# Patient Record
Sex: FEMALE | Race: WHITE | NOT HISPANIC OR LATINO | URBAN - METROPOLITAN AREA
[De-identification: names, ages, dates, MRNs, and addresses within clinical notes are randomized per-mention and may not be internally consistent; named-entity substitution may affect disease eponyms.]

---

## 2017-10-12 ENCOUNTER — INPATIENT (INPATIENT)
Facility: HOSPITAL | Age: 72
LOS: 3 days | Discharge: NURSING, REHAB OR LONG-TERM CARE | End: 2017-10-16
Attending: INTERNAL MEDICINE

## 2017-10-12 DIAGNOSIS — S70.00XA CONTUSION OF UNSPECIFIED HIP, INITIAL ENCOUNTER: ICD-10-CM

## 2017-10-16 ENCOUNTER — INPATIENT (INPATIENT)
Facility: HOSPITAL | Age: 72
LOS: 15 days | Discharge: NURSING, REHAB OR LONG-TERM CARE | End: 2017-11-01
Attending: PHYSICAL MEDICINE & REHABILITATION

## 2017-10-16 DIAGNOSIS — S70.00XA CONTUSION OF UNSPECIFIED HIP, INITIAL ENCOUNTER: ICD-10-CM

## 2017-10-23 DIAGNOSIS — Z96.641 PRESENCE OF RIGHT ARTIFICIAL HIP JOINT: ICD-10-CM

## 2017-10-23 DIAGNOSIS — K59.00 CONSTIPATION, UNSPECIFIED: ICD-10-CM

## 2017-10-23 DIAGNOSIS — G35 MULTIPLE SCLEROSIS: ICD-10-CM

## 2017-10-23 DIAGNOSIS — R26.2 DIFFICULTY IN WALKING, NOT ELSEWHERE CLASSIFIED: ICD-10-CM

## 2017-10-23 DIAGNOSIS — D72.829 ELEVATED WHITE BLOOD CELL COUNT, UNSPECIFIED: ICD-10-CM

## 2017-10-23 DIAGNOSIS — K21.9 GASTRO-ESOPHAGEAL REFLUX DISEASE WITHOUT ESOPHAGITIS: ICD-10-CM
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
## 2017-10-23 DIAGNOSIS — S32.10XA UNSPECIFIED FRACTURE OF SACRUM, INITIAL ENCOUNTER FOR CLOSED FRACTURE: ICD-10-CM
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
## 2017-10-23 DIAGNOSIS — D68.51 ACTIVATED PROTEIN C RESISTANCE: ICD-10-CM

## 2017-10-24 DIAGNOSIS — S32.19XA OTHER FRACTURE OF SACRUM, INITIAL ENCOUNTER FOR CLOSED FRACTURE: ICD-10-CM

## 2017-10-24 DIAGNOSIS — S32.491A OTHER SPECIFIED FRACTURE OF RIGHT ACETABULUM, INITIAL ENCOUNTER FOR CLOSED FRACTURE: ICD-10-CM

## 2017-10-24 DIAGNOSIS — E78.5 HYPERLIPIDEMIA, UNSPECIFIED: ICD-10-CM
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
## 2017-11-03 DIAGNOSIS — R42 DIZZINESS AND GIDDINESS: ICD-10-CM

## 2017-11-03 DIAGNOSIS — D68.51 ACTIVATED PROTEIN C RESISTANCE: ICD-10-CM

## 2017-11-03 DIAGNOSIS — S32.10XD UNSPECIFIED FRACTURE OF SACRUM, SUBSEQUENT ENCOUNTER FOR FRACTURE WITH ROUTINE HEALING: ICD-10-CM
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
## 2017-11-03 DIAGNOSIS — M54.9 DORSALGIA, UNSPECIFIED: ICD-10-CM

## 2017-11-03 DIAGNOSIS — R00.0 TACHYCARDIA, UNSPECIFIED: ICD-10-CM

## 2017-11-03 DIAGNOSIS — Z90.49 ACQUIRED ABSENCE OF OTHER SPECIFIED PARTS OF DIGESTIVE TRACT: ICD-10-CM

## 2017-11-03 DIAGNOSIS — K20.9 ESOPHAGITIS, UNSPECIFIED: ICD-10-CM

## 2017-11-03 DIAGNOSIS — Z51.89 ENCOUNTER FOR OTHER SPECIFIED AFTERCARE: ICD-10-CM

## 2017-11-03 DIAGNOSIS — Z88.0 ALLERGY STATUS TO PENICILLIN: ICD-10-CM

## 2017-11-03 DIAGNOSIS — S32.9XXD FRACTURE OF UNSPECIFIED PARTS OF LUMBOSACRAL SPINE AND PELVIS, SUBSEQUENT ENCOUNTER FOR FRACTURE WITH ROUTINE HEALING: ICD-10-CM

## 2017-11-03 DIAGNOSIS — Z96.651 PRESENCE OF RIGHT ARTIFICIAL KNEE JOINT: ICD-10-CM

## 2017-11-03 DIAGNOSIS — E78.00 PURE HYPERCHOLESTEROLEMIA, UNSPECIFIED: ICD-10-CM

## 2017-11-03 DIAGNOSIS — G35 MULTIPLE SCLEROSIS: ICD-10-CM

## 2017-11-03 DIAGNOSIS — Z88.2 ALLERGY STATUS TO SULFONAMIDES: ICD-10-CM

## 2017-11-03 DIAGNOSIS — S32.431D: ICD-10-CM

## 2017-11-06 DIAGNOSIS — S32.411D DISPLACED FRACTURE OF ANTERIOR WALL OF RIGHT ACETABULUM, SUBSEQUENT ENCOUNTER FOR FRACTURE WITH ROUTINE HEALING: ICD-10-CM

## 2017-11-06 DIAGNOSIS — S32.10XD UNSPECIFIED FRACTURE OF SACRUM, SUBSEQUENT ENCOUNTER FOR FRACTURE WITH ROUTINE HEALING: ICD-10-CM

## 2019-05-29 ENCOUNTER — APPOINTMENT (OUTPATIENT)
Dept: CARDIOLOGY | Facility: CLINIC | Age: 74
End: 2019-05-29
Payer: MEDICARE

## 2019-05-29 PROCEDURE — 99214 OFFICE O/P EST MOD 30 MIN: CPT

## 2019-05-29 PROCEDURE — 93000 ELECTROCARDIOGRAM COMPLETE: CPT

## 2019-09-04 ENCOUNTER — APPOINTMENT (OUTPATIENT)
Dept: CARDIOLOGY | Facility: CLINIC | Age: 74
End: 2019-09-04
Payer: MEDICARE

## 2019-09-04 PROCEDURE — 99214 OFFICE O/P EST MOD 30 MIN: CPT | Mod: 25

## 2019-09-04 PROCEDURE — 93000 ELECTROCARDIOGRAM COMPLETE: CPT

## 2019-09-09 ENCOUNTER — APPOINTMENT (OUTPATIENT)
Dept: CARDIOLOGY | Facility: CLINIC | Age: 74
End: 2019-09-09
Payer: MEDICARE

## 2019-09-09 PROCEDURE — 93975 VASCULAR STUDY: CPT

## 2019-09-18 ENCOUNTER — APPOINTMENT (OUTPATIENT)
Dept: CARDIOLOGY | Facility: CLINIC | Age: 74
End: 2019-09-18
Payer: MEDICARE

## 2019-09-18 PROCEDURE — 99214 OFFICE O/P EST MOD 30 MIN: CPT

## 2019-09-18 PROCEDURE — 93000 ELECTROCARDIOGRAM COMPLETE: CPT

## 2019-10-21 ENCOUNTER — OUTPATIENT (OUTPATIENT)
Dept: OUTPATIENT SERVICES | Facility: HOSPITAL | Age: 74
LOS: 1 days | Discharge: HOME | End: 2019-10-21
Payer: MEDICARE

## 2019-10-21 DIAGNOSIS — Z12.31 ENCOUNTER FOR SCREENING MAMMOGRAM FOR MALIGNANT NEOPLASM OF BREAST: ICD-10-CM

## 2019-10-21 PROCEDURE — 77067 SCR MAMMO BI INCL CAD: CPT | Mod: 26

## 2019-10-21 PROCEDURE — 77063 BREAST TOMOSYNTHESIS BI: CPT | Mod: 26

## 2019-11-12 ENCOUNTER — OUTPATIENT (OUTPATIENT)
Dept: OUTPATIENT SERVICES | Facility: HOSPITAL | Age: 74
LOS: 1 days | Discharge: HOME | End: 2019-11-12
Payer: MEDICARE

## 2019-11-12 DIAGNOSIS — Z12.31 ENCOUNTER FOR SCREENING MAMMOGRAM FOR MALIGNANT NEOPLASM OF BREAST: ICD-10-CM

## 2019-11-12 PROCEDURE — 76641 ULTRASOUND BREAST COMPLETE: CPT | Mod: 26,50

## 2019-11-19 DIAGNOSIS — R92.2 INCONCLUSIVE MAMMOGRAM: ICD-10-CM

## 2019-11-22 ENCOUNTER — APPOINTMENT (OUTPATIENT)
Dept: BREAST CENTER | Facility: CLINIC | Age: 74
End: 2019-11-22
Payer: MEDICARE

## 2019-11-22 VITALS
BODY MASS INDEX: 26.43 KG/M2 | WEIGHT: 140 LBS | TEMPERATURE: 98.4 F | DIASTOLIC BLOOD PRESSURE: 78 MMHG | HEIGHT: 61 IN | SYSTOLIC BLOOD PRESSURE: 112 MMHG

## 2019-11-22 DIAGNOSIS — Z86.79 PERSONAL HISTORY OF OTHER DISEASES OF THE CIRCULATORY SYSTEM: ICD-10-CM

## 2019-11-22 PROCEDURE — 99203 OFFICE O/P NEW LOW 30 MIN: CPT

## 2019-11-24 PROBLEM — Z86.79 HISTORY OF HYPERTENSION: Status: RESOLVED | Noted: 2019-11-24 | Resolved: 2019-11-24

## 2019-11-24 NOTE — DATA REVIEWED
[FreeTextEntry1] : EXAM: US BREAST COMPLETE BI\par \par \par PROCEDURE DATE: 11/12/2019\par \par \par \par INTERPRETATION: Clinical History / Reason for exam: Dense breast screening\par \par The patient reports her last clinical breast examination was performed\par within the past year.\par \par Family history: Unknown\par \par Comparisons: Priors dating back to 2012.\par \par Findings:\par \par Ultrasound:\par \par Bilateral whole breast ultrasound was performed.\par \par No suspicious solid or cystic masses. No axillary adenopathy.\par \par Impression: No sonographic evidence of malignancy.\par \par Recommendation: Unless otherwise indicated by clinical findings, annual\par screening mammography recommended.\par \par BI-RADS Category 1: Negative\par \par \par The above findings and recommendations were discussed with the patient at\par the time of the examination.\par \par \par \par \par \par \par FAISAL SANTIAGO M.D., ATTENDING RADIOLOGIST\par This document has been electronically signed. Nov 12 2019 2:38PM\par EXAM: MG MAMMO SCREEN W LAUREN BI#\par \par \par PROCEDURE DATE: 10/21/2019\par \par \par \par INTERPRETATION: HISTORY:\par Bilateral MG MAMMO SCREEN W LAUREN BI# was performed. Patient is 74 years old\par and is seen for screening. The patient has a history of right needle\par biopsy in 2014 - benign and right needle biopsy more than 10 years ago -\par benign.\par \par RISK ASSESSMENT:\par NCI Lifetime Risk: 5.9\par Tyrer-Cuzick Lifetime Risk: 3.8\par \par CLINICAL BREAST EXAM:\par The patient reports her last clinical breast exam was performed 5 months ago.\par \par COMPARISON STUDIES:\par The present examination has been compared to prior imaging studies performed\par at An Outside Location on 10/18/2012, 10/29/2013, 01/13/2015, 04/05/2016,\par 09/22/2017 and 10/08/2018.\par \par MAMMOGRAM FINDINGS:\par Mammography was performed including the following views: bilateral\par craniocaudal with tomosynthesis, bilateral mediolateral oblique with\par tomosynthesis. The examination includes digital synthetic 2D and digital\par tomosynthesis 3D images. Additional imaging analysis was performed using CAD\par (computer-aided detection) software.\par \par The breasts are heterogeneously dense, which may obscure small masses.\par \par No suspicious mass, grouping of calcifications, or other abnormality is\par identified. There are no significant changes from the prior study.\par \par IMPRESSION:\par No mammographic evidence of malignancy.\par \par In light of dense parenchyma, consideration may be given to a screening\par ultrasound.\par \par RECOMMENDATION:\par Unless otherwise indicated by clinical findings, annual screening\par mammography recommended.\par \par ASSESSMENT:\par BI-RADS Category 1: Negative\par \par The patient will be notified of these results by telephone, and will also be\par mailed a written summary in layman's terms.\par \par \par \par \par \par \par \par \par SABINE COOPER M.D., ATTENDING RADIOLOGIST\par This document has been electronically signed. Oct 21 2019 2:04PM\par \par \par \par

## 2019-11-24 NOTE — PHYSICAL EXAM
[Normocephalic] : normocephalic [Atraumatic] : atraumatic [EOMI] : extra ocular movement intact [No Supraclavicular Adenopathy] : no supraclavicular adenopathy [No Cervical Adenopathy] : no cervical adenopathy [Symmetrical] : symmetrical [No dominant masses] : no dominant masses in right breast  [No dominant masses] : no dominant masses left breast [No Nipple Retraction] : no left nipple retraction [No Nipple Discharge] : no left nipple discharge [No Axillary Lymphadenopathy] : no left axillary lymphadenopathy [Soft] : abdomen soft [Not Tender] : non-tender [No Edema] : no edema [No Rashes] : no rashes [No Ulceration] : no ulceration [Examined in the supine and seated position] : examined in the supine and seated position [de-identified] : @12N5, 1 cm nodularity  [de-identified] : multiple b/l nondiscrete nodularities palpated throughout both breasts

## 2019-11-24 NOTE — HISTORY OF PRESENT ILLNESS
[FreeTextEntry1] : Teresita presents with fibrocystic changes.  She would like to establish her breast care in [place].  \par \par She has no breast related complaints at this time.  She denies any breast pain, has not palpated any new palpable masses in either breast and denies any nipple discharge or retraction.  Her most recent imaging was performed on 10/21/19, a b/l mammogram and on 19, a b/l US, which was unrevealing for any suspicious lesions in either breast, both studies deemed BIRADS 1. \par \par She has had two prior RIGHT breast biopsies in the past which were reportedly benign. \par \par HISTORICAL RISK FACTORS: \par -no prior breast surgeries \par -no family history of breast or ovarian cancer \par -, age at first live birth was 24\par -no prior OCP use \par -s/p ovarian cystectomy in the past \par \par \par

## 2019-11-24 NOTE — REVIEW OF SYSTEMS
[As Noted in HPI] : as noted in HPI [Negative] : Heme/Lymph [Skin Wound] : no skin wound [Abn Vaginal Bleeding] : no unexplained vaginal bleeding [Skin Lesions] : no skin lesions [Breast Lump] : no breast lump [Breast Pain] : no breast pain [Hot Flashes] : no hot flashes

## 2019-11-24 NOTE — PAST MEDICAL HISTORY
[Menarche Age ____] : age at menarche was [unfilled] [Menopause Age____] : age at menopause was [unfilled] [Total Preg ___] : G[unfilled] [Live Births ___] : P[unfilled]  [Age At Live Birth ___] : Age at live birth: [unfilled] [History of Hormone Replacement Treatment] : has no history of hormone replacement treatment [FreeTextEntry5] : ovarian cystectomy in the past  [FreeTextEntry7] : denies [FreeTextEntry8] : yes x 6 months  [FreeTextEntry6] : denies

## 2019-11-24 NOTE — ASSESSMENT
[FreeTextEntry1] : Teresita is a 74 postmenopausal F with fibrocystic breast disease. \par \par On exam, she has b/l nondiscrete nodularities palpated throughout both breasts, but no suspicious masses were palpated within either breast.  Her most recent imaging was performed on 10/21/19, a b/l mammogram and on 11/12/19, a b/l US, which was unrevealing for any suspicious lesions in either breast, both studies deemed BIRADS 1. She will be due for a b/l screening mammogram and US On 10/21/2020.  This will be scheduled for her today.  I will have her follow up in 1 year for a CBE. \par \par We discussed dense breasts.  Increasing breast density has been found to increase ones risk of breast cancer, but at this time, there is no clear indication for additional imaging in this setting, as both US and MRI have not been found to improve survival.  One can consider bilateral screening US.  However, out of 1000 women screened, the use of routine US will only identify an additional 3-5 cancers.  The use of US was found to increase the likelihood of undergoing more imaging and more biopsies.  She does have dense breasts.  We have decided to proceed with screening bilateral breast US at this time.  This will be scheduled with her next screening mammogram.\par \par She is otherwise at an average risk for breast cancer and should continue with annual screening mammograms and US. \par \par ALl of her questions were answered.  She knows to call with any further questions or concerns. \par \par PLAN\par -f/up after b/l screening mammogram and US 10/21/2020 \par

## 2020-02-18 NOTE — ASU PATIENT PROFILE, ADULT - NSSUBSTANCEUSE_GEN_ALL_CORE_SD
Next CAPTEM cycle to start 11/21/2017.  Patient reports that he has been tolerating CAPTEM well with no missed doses.  He denies any side effects at this time.  
never used

## 2020-02-18 NOTE — ASU PATIENT PROFILE, ADULT - PSH
Abscess, ovarian    H/O cholecystitis  h/o lap neil  History of kidney stones    History of total right hip replacement

## 2020-02-18 NOTE — ASU PATIENT PROFILE, ADULT - PMH
Abnormal cholesterol test    Acquired cataract    Acute depression    Acute gastritis    Atypical migraine    Chronic GERD    Generalized OA    H/O vertigo    Mild HTN    MS (multiple sclerosis)

## 2020-02-19 ENCOUNTER — OUTPATIENT (OUTPATIENT)
Dept: OUTPATIENT SERVICES | Facility: HOSPITAL | Age: 75
LOS: 1 days | Discharge: HOME | End: 2020-02-19

## 2020-02-19 VITALS — DIASTOLIC BLOOD PRESSURE: 75 MMHG | HEART RATE: 58 BPM | RESPIRATION RATE: 17 BRPM | SYSTOLIC BLOOD PRESSURE: 156 MMHG

## 2020-02-19 VITALS
TEMPERATURE: 97 F | OXYGEN SATURATION: 100 % | RESPIRATION RATE: 17 BRPM | HEIGHT: 61 IN | HEART RATE: 70 BPM | WEIGHT: 134.04 LBS | DIASTOLIC BLOOD PRESSURE: 63 MMHG | SYSTOLIC BLOOD PRESSURE: 145 MMHG

## 2020-02-19 DIAGNOSIS — Z87.442 PERSONAL HISTORY OF URINARY CALCULI: Chronic | ICD-10-CM

## 2020-02-19 DIAGNOSIS — Z96.641 PRESENCE OF RIGHT ARTIFICIAL HIP JOINT: Chronic | ICD-10-CM

## 2020-02-19 DIAGNOSIS — Z87.19 PERSONAL HISTORY OF OTHER DISEASES OF THE DIGESTIVE SYSTEM: Chronic | ICD-10-CM

## 2020-02-19 DIAGNOSIS — N70.92 OOPHORITIS, UNSPECIFIED: Chronic | ICD-10-CM

## 2020-02-28 DIAGNOSIS — Z88.2 ALLERGY STATUS TO SULFONAMIDES: ICD-10-CM

## 2020-02-28 DIAGNOSIS — Z88.0 ALLERGY STATUS TO PENICILLIN: ICD-10-CM

## 2020-02-28 DIAGNOSIS — I10 ESSENTIAL (PRIMARY) HYPERTENSION: ICD-10-CM

## 2020-02-28 DIAGNOSIS — H25.12 AGE-RELATED NUCLEAR CATARACT, LEFT EYE: ICD-10-CM

## 2020-05-18 ENCOUNTER — APPOINTMENT (OUTPATIENT)
Dept: CARDIOLOGY | Facility: CLINIC | Age: 75
End: 2020-05-18

## 2020-07-13 ENCOUNTER — RECORD ABSTRACTING (OUTPATIENT)
Age: 75
End: 2020-07-13

## 2020-07-13 DIAGNOSIS — Z86.2 PERSONAL HISTORY OF DISEASES OF THE BLOOD AND BLOOD-FORMING ORGANS AND CERTAIN DISORDERS INVOLVING THE IMMUNE MECHANISM: ICD-10-CM

## 2020-07-13 DIAGNOSIS — Z86.69 PERSONAL HISTORY OF OTHER DISEASES OF THE NERVOUS SYSTEM AND SENSE ORGANS: ICD-10-CM

## 2020-07-13 DIAGNOSIS — Z86.39 PERSONAL HISTORY OF OTHER ENDOCRINE, NUTRITIONAL AND METABOLIC DISEASE: ICD-10-CM

## 2020-07-13 DIAGNOSIS — Z86.79 PERSONAL HISTORY OF OTHER DISEASES OF THE CIRCULATORY SYSTEM: ICD-10-CM

## 2020-07-13 DIAGNOSIS — G43.909 MIGRAINE, UNSPECIFIED, NOT INTRACTABLE, W/OUT STATUS MIGRAINOSUS: ICD-10-CM

## 2020-07-13 DIAGNOSIS — I65.23 OCCLUSION AND STENOSIS OF BILATERAL CAROTID ARTERIES: ICD-10-CM

## 2020-07-31 ENCOUNTER — RESULT CHARGE (OUTPATIENT)
Age: 75
End: 2020-07-31

## 2020-07-31 ENCOUNTER — APPOINTMENT (OUTPATIENT)
Dept: CARDIOLOGY | Facility: CLINIC | Age: 75
End: 2020-07-31
Payer: MEDICARE

## 2020-07-31 VITALS
WEIGHT: 128 LBS | SYSTOLIC BLOOD PRESSURE: 122 MMHG | BODY MASS INDEX: 24.17 KG/M2 | TEMPERATURE: 99.1 F | HEART RATE: 63 BPM | DIASTOLIC BLOOD PRESSURE: 72 MMHG | HEIGHT: 61 IN

## 2020-07-31 DIAGNOSIS — Z80.9 FAMILY HISTORY OF MALIGNANT NEOPLASM, UNSPECIFIED: ICD-10-CM

## 2020-07-31 DIAGNOSIS — Z82.49 FAMILY HISTORY OF ISCHEMIC HEART DISEASE AND OTHER DISEASES OF THE CIRCULATORY SYSTEM: ICD-10-CM

## 2020-07-31 DIAGNOSIS — Z78.9 OTHER SPECIFIED HEALTH STATUS: ICD-10-CM

## 2020-07-31 PROBLEM — F32.9 MAJOR DEPRESSIVE DISORDER, SINGLE EPISODE, UNSPECIFIED: Chronic | Status: ACTIVE | Noted: 2020-02-19

## 2020-07-31 PROBLEM — K29.00 ACUTE GASTRITIS WITHOUT BLEEDING: Chronic | Status: ACTIVE | Noted: 2020-02-19

## 2020-07-31 PROBLEM — G43.009 MIGRAINE WITHOUT AURA, NOT INTRACTABLE, WITHOUT STATUS MIGRAINOSUS: Chronic | Status: ACTIVE | Noted: 2020-02-19

## 2020-07-31 PROBLEM — K21.9 GASTRO-ESOPHAGEAL REFLUX DISEASE WITHOUT ESOPHAGITIS: Chronic | Status: ACTIVE | Noted: 2020-02-19

## 2020-07-31 PROBLEM — Z87.898 PERSONAL HISTORY OF OTHER SPECIFIED CONDITIONS: Chronic | Status: ACTIVE | Noted: 2020-02-19

## 2020-07-31 PROBLEM — I10 ESSENTIAL (PRIMARY) HYPERTENSION: Chronic | Status: ACTIVE | Noted: 2020-02-19

## 2020-07-31 PROBLEM — G35 MULTIPLE SCLEROSIS: Chronic | Status: ACTIVE | Noted: 2020-02-19

## 2020-07-31 PROBLEM — E78.9 DISORDER OF LIPOPROTEIN METABOLISM, UNSPECIFIED: Chronic | Status: ACTIVE | Noted: 2020-02-19

## 2020-07-31 PROBLEM — H26.9 UNSPECIFIED CATARACT: Chronic | Status: ACTIVE | Noted: 2020-02-19

## 2020-07-31 PROBLEM — M15.9 POLYOSTEOARTHRITIS, UNSPECIFIED: Chronic | Status: ACTIVE | Noted: 2020-02-19

## 2020-07-31 PROCEDURE — 99214 OFFICE O/P EST MOD 30 MIN: CPT

## 2020-07-31 PROCEDURE — 93000 ELECTROCARDIOGRAM COMPLETE: CPT

## 2020-07-31 RX ORDER — OLMESARTAN MEDOXOMIL 20 MG/1
20 TABLET, FILM COATED ORAL DAILY
Refills: 0 | Status: COMPLETED | COMMUNITY
End: 2020-07-31

## 2020-07-31 RX ORDER — LAMOTRIGINE 150 MG/1
TABLET ORAL
Refills: 0 | Status: COMPLETED | COMMUNITY
End: 2020-07-31

## 2020-07-31 RX ORDER — MECLIZINE HYDROCHLORIDE 25 MG/1
TABLET ORAL
Refills: 0 | Status: COMPLETED | COMMUNITY
End: 2020-07-31

## 2020-07-31 RX ORDER — PERPHENAZINE AND AMITRIPTYLINE HYDROCHLORIDE 2; 10 MG/1; MG/1
2-10 TABLET, FILM COATED ORAL
Refills: 0 | Status: COMPLETED | COMMUNITY
End: 2020-07-31

## 2020-07-31 RX ORDER — OMEPRAZOLE MAGNESIUM 40 MG/1
CAPSULE, DELAYED RELEASE ORAL
Refills: 0 | Status: COMPLETED | COMMUNITY
End: 2020-07-31

## 2020-07-31 RX ORDER — HYDROCHLOROTHIAZIDE 12.5 MG/1
TABLET ORAL
Refills: 0 | Status: COMPLETED | COMMUNITY
End: 2020-07-31

## 2020-07-31 RX ORDER — TRAMADOL HYDROCHLORIDE 25 MG/1
TABLET, COATED ORAL
Refills: 0 | Status: COMPLETED | COMMUNITY
End: 2020-07-31

## 2020-07-31 RX ORDER — PERPHENAZINE 8 MG/1
TABLET ORAL
Refills: 0 | Status: COMPLETED | COMMUNITY
End: 2020-07-31

## 2020-07-31 RX ORDER — ROSUVASTATIN CALCIUM 5 MG/1
TABLET, FILM COATED ORAL
Refills: 0 | Status: COMPLETED | COMMUNITY
End: 2020-07-31

## 2020-07-31 NOTE — PHYSICAL EXAM
[General Appearance - Well Developed] : well developed [Normal Appearance] : normal appearance [Well Groomed] : well groomed [General Appearance - Well Nourished] : well nourished [Normal Conjunctiva] : the conjunctiva exhibited no abnormalities [General Appearance - In No Acute Distress] : no acute distress [No Deformities] : no deformities [Eyelids - No Xanthelasma] : the eyelids demonstrated no xanthelasmas [Normal Oral Mucosa] : normal oral mucosa [No Oral Cyanosis] : no oral cyanosis [No Oral Pallor] : no oral pallor [Normal Jugular Venous A Waves Present] : normal jugular venous A waves present [Normal Jugular Venous V Waves Present] : normal jugular venous V waves present [No Jugular Venous Clay A Waves] : no jugular venous clay A waves [Respiration, Rhythm And Depth] : normal respiratory rhythm and effort [Auscultation Breath Sounds / Voice Sounds] : lungs were clear to auscultation bilaterally [Exaggerated Use Of Accessory Muscles For Inspiration] : no accessory muscle use [Heart Rate And Rhythm] : heart rate and rhythm were normal [Heart Sounds] : normal S1 and S2 [Murmurs] : no murmurs present [Bowel Sounds] : normal bowel sounds [Abdomen Soft] : soft [Abdomen Mass (___ Cm)] : no abdominal mass palpated [Nail Clubbing] : no clubbing of the fingernails [] : no ischemic changes [Cyanosis, Localized] : no localized cyanosis [Oriented To Time, Place, And Person] : oriented to person, place, and time

## 2020-07-31 NOTE — REVIEW OF SYSTEMS
[see HPI] : see HPI [Negative] : Gastrointestinal [Confusion] : no confusion was observed [Anxiety] : no anxiety

## 2020-07-31 NOTE — DISCUSSION/SUMMARY
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

## 2020-10-28 ENCOUNTER — RESULT REVIEW (OUTPATIENT)
Age: 75
End: 2020-10-28

## 2020-10-28 ENCOUNTER — OUTPATIENT (OUTPATIENT)
Dept: OUTPATIENT SERVICES | Facility: HOSPITAL | Age: 75
LOS: 1 days | Discharge: HOME | End: 2020-10-28
Payer: MEDICARE

## 2020-10-28 DIAGNOSIS — N70.92 OOPHORITIS, UNSPECIFIED: Chronic | ICD-10-CM

## 2020-10-28 DIAGNOSIS — Z87.19 PERSONAL HISTORY OF OTHER DISEASES OF THE DIGESTIVE SYSTEM: Chronic | ICD-10-CM

## 2020-10-28 DIAGNOSIS — Z96.641 PRESENCE OF RIGHT ARTIFICIAL HIP JOINT: Chronic | ICD-10-CM

## 2020-10-28 DIAGNOSIS — R92.2 INCONCLUSIVE MAMMOGRAM: ICD-10-CM

## 2020-10-28 DIAGNOSIS — Z12.31 ENCOUNTER FOR SCREENING MAMMOGRAM FOR MALIGNANT NEOPLASM OF BREAST: ICD-10-CM

## 2020-10-28 DIAGNOSIS — Z87.442 PERSONAL HISTORY OF URINARY CALCULI: Chronic | ICD-10-CM

## 2020-10-28 PROCEDURE — 77063 BREAST TOMOSYNTHESIS BI: CPT | Mod: 26

## 2020-10-28 PROCEDURE — 77067 SCR MAMMO BI INCL CAD: CPT | Mod: 26

## 2020-10-28 PROCEDURE — 76641 ULTRASOUND BREAST COMPLETE: CPT | Mod: 26,50

## 2020-11-16 ENCOUNTER — APPOINTMENT (OUTPATIENT)
Dept: OBGYN | Facility: CLINIC | Age: 75
End: 2020-11-16
Payer: MEDICARE

## 2020-11-16 VITALS
WEIGHT: 129 LBS | DIASTOLIC BLOOD PRESSURE: 78 MMHG | TEMPERATURE: 97.2 F | BODY MASS INDEX: 24.37 KG/M2 | SYSTOLIC BLOOD PRESSURE: 120 MMHG

## 2020-11-16 DIAGNOSIS — N89.8 OTHER SPECIFIED NONINFLAMMATORY DISORDERS OF VAGINA: ICD-10-CM

## 2020-11-16 DIAGNOSIS — R30.0 DYSURIA: ICD-10-CM

## 2020-11-16 DIAGNOSIS — L29.2 PRURITUS VULVAE: ICD-10-CM

## 2020-11-16 DIAGNOSIS — N95.2 POSTMENOPAUSAL ATROPHIC VAGINITIS: ICD-10-CM

## 2020-11-16 PROCEDURE — 99203 OFFICE O/P NEW LOW 30 MIN: CPT

## 2020-11-16 NOTE — DISCUSSION/SUMMARY
[FreeTextEntry1] : Patient advised to return for Pap in approximately 3-6 months\par Self breast exam stressed\par Prescribed bilateral screening mammogram\par Continue with Premarin cream as needed, patient understands risks benefits and alternatives regarding hormonal treatment.\par Patient advised to follow-up with urogynecologist\par

## 2020-11-16 NOTE — HISTORY OF PRESENT ILLNESS
[FreeTextEntry1] : Patient is 75 years old para 2-0-0-2 last menstrual period age 53\par She denies postmenopausal bleeding\par Patient complains of vaginal dryness for which she was previously prescribed Premarin cream and lidocaine jelly 2% by Dr. Medrano.\par Patient also states that she takes Macrobid intermittently for burning on urination prescribed by urogynecologist Dr. Baxter

## 2020-11-16 NOTE — PHYSICAL EXAM
[Appropriately responsive] : appropriately responsive [Alert] : alert [No Acute Distress] : no acute distress [No Lymphadenopathy] : no lymphadenopathy [Regular Rate Rhythm] : regular rate rhythm [No Murmurs] : no murmurs [Clear to Auscultation B/L] : clear to auscultation bilaterally [Soft] : soft [Non-tender] : non-tender [Non-distended] : non-distended [No HSM] : No HSM [No Lesions] : no lesions [No Mass] : no mass [Oriented x3] : oriented x3 [Examination Of The Breasts] : a normal appearance [No Masses] : no breast masses were palpable [Labia Majora] : normal [Labia Minora] : normal [Atrophy] : atrophy [Cystocele] : a cystocele [Normal] : normal [Uterine Adnexae] : normal

## 2021-01-14 NOTE — PHYSICAL EXAM
[General Appearance - Well Developed] : well developed [Normal Appearance] : normal appearance [Well Groomed] : well groomed [General Appearance - Well Nourished] : well nourished [No Deformities] : no deformities [General Appearance - In No Acute Distress] : no acute distress [Normal Conjunctiva] : the conjunctiva exhibited no abnormalities [Eyelids - No Xanthelasma] : the eyelids demonstrated no xanthelasmas [Normal Oral Mucosa] : normal oral mucosa [No Oral Pallor] : no oral pallor [No Oral Cyanosis] : no oral cyanosis [Normal Jugular Venous A Waves Present] : normal jugular venous A waves present [Normal Jugular Venous V Waves Present] : normal jugular venous V waves present [No Jugular Venous Clay A Waves] : no jugular venous clay A waves [Respiration, Rhythm And Depth] : normal respiratory rhythm and effort [Exaggerated Use Of Accessory Muscles For Inspiration] : no accessory muscle use [Auscultation Breath Sounds / Voice Sounds] : lungs were clear to auscultation bilaterally [Heart Rate And Rhythm] : heart rate and rhythm were normal [Heart Sounds] : normal S1 and S2 [Murmurs] : no murmurs present [Bowel Sounds] : normal bowel sounds [Abdomen Soft] : soft [Abdomen Mass (___ Cm)] : no abdominal mass palpated [Nail Clubbing] : no clubbing of the fingernails [Cyanosis, Localized] : no localized cyanosis [] : no ischemic changes [Oriented To Time, Place, And Person] : oriented to person, place, and time

## 2021-01-15 ENCOUNTER — APPOINTMENT (OUTPATIENT)
Dept: CARDIOLOGY | Facility: CLINIC | Age: 76
End: 2021-01-15
Payer: MEDICARE

## 2021-01-15 VITALS
DIASTOLIC BLOOD PRESSURE: 78 MMHG | WEIGHT: 130 LBS | TEMPERATURE: 97.2 F | HEART RATE: 77 BPM | HEIGHT: 61 IN | SYSTOLIC BLOOD PRESSURE: 148 MMHG | BODY MASS INDEX: 24.55 KG/M2

## 2021-01-15 PROCEDURE — 99214 OFFICE O/P EST MOD 30 MIN: CPT

## 2021-01-15 PROCEDURE — 93000 ELECTROCARDIOGRAM COMPLETE: CPT

## 2021-01-15 NOTE — ASSESSMENT
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 related will add norvasc 2.5 a day \par can try few doses of alleve to see if decrease sypmtoms since feel like pericarditis as per pt if it is helped could consider colchicien \par pthall also

## 2021-01-21 ENCOUNTER — APPOINTMENT (OUTPATIENT)
Dept: CARDIOLOGY | Facility: CLINIC | Age: 76
End: 2021-01-21
Payer: MEDICARE

## 2021-01-21 PROCEDURE — 93306 TTE W/DOPPLER COMPLETE: CPT

## 2021-01-25 ENCOUNTER — OUTPATIENT (OUTPATIENT)
Dept: OUTPATIENT SERVICES | Facility: HOSPITAL | Age: 76
LOS: 1 days | Discharge: HOME | End: 2021-01-25
Payer: MEDICARE

## 2021-01-25 ENCOUNTER — RESULT REVIEW (OUTPATIENT)
Age: 76
End: 2021-01-25

## 2021-01-25 DIAGNOSIS — R07.9 CHEST PAIN, UNSPECIFIED: ICD-10-CM

## 2021-01-25 DIAGNOSIS — R03.0 ELEVATED BLOOD-PRESSURE READING, WITHOUT DIAGNOSIS OF HYPERTENSION: ICD-10-CM

## 2021-01-25 DIAGNOSIS — Z96.641 PRESENCE OF RIGHT ARTIFICIAL HIP JOINT: Chronic | ICD-10-CM

## 2021-01-25 DIAGNOSIS — Z87.19 PERSONAL HISTORY OF OTHER DISEASES OF THE DIGESTIVE SYSTEM: Chronic | ICD-10-CM

## 2021-01-25 DIAGNOSIS — Z87.442 PERSONAL HISTORY OF URINARY CALCULI: Chronic | ICD-10-CM

## 2021-01-25 DIAGNOSIS — N70.92 OOPHORITIS, UNSPECIFIED: Chronic | ICD-10-CM

## 2021-01-25 PROCEDURE — 78452 HT MUSCLE IMAGE SPECT MULT: CPT | Mod: 26

## 2021-01-25 PROCEDURE — 93018 CV STRESS TEST I&R ONLY: CPT

## 2021-02-02 NOTE — PHYSICAL EXAM
[Normal Appearance] : normal appearance [General Appearance - Well Developed] : well developed [Well Groomed] : well groomed [General Appearance - Well Nourished] : well nourished [No Deformities] : no deformities [General Appearance - In No Acute Distress] : no acute distress [Normal Conjunctiva] : the conjunctiva exhibited no abnormalities [Eyelids - No Xanthelasma] : the eyelids demonstrated no xanthelasmas [Normal Oral Mucosa] : normal oral mucosa [No Oral Pallor] : no oral pallor [No Oral Cyanosis] : no oral cyanosis [Normal Jugular Venous A Waves Present] : normal jugular venous A waves present [Normal Jugular Venous V Waves Present] : normal jugular venous V waves present [No Jugular Venous Clay A Waves] : no jugular venous clay A waves [Respiration, Rhythm And Depth] : normal respiratory rhythm and effort [Exaggerated Use Of Accessory Muscles For Inspiration] : no accessory muscle use [Auscultation Breath Sounds / Voice Sounds] : lungs were clear to auscultation bilaterally [Heart Rate And Rhythm] : heart rate and rhythm were normal [Heart Sounds] : normal S1 and S2 [Murmurs] : no murmurs present [Bowel Sounds] : normal bowel sounds [Abdomen Soft] : soft [Abdomen Mass (___ Cm)] : no abdominal mass palpated [Nail Clubbing] : no clubbing of the fingernails [Cyanosis, Localized] : no localized cyanosis [] : no ischemic changes [Oriented To Time, Place, And Person] : oriented to person, place, and time

## 2021-02-02 NOTE — REVIEW OF SYSTEMS
[see HPI] : see HPI [Confusion] : no confusion was observed [Anxiety] : no anxiety [Negative] : Gastrointestinal

## 2021-02-08 ENCOUNTER — APPOINTMENT (OUTPATIENT)
Dept: CARDIOLOGY | Facility: CLINIC | Age: 76
End: 2021-02-08
Payer: MEDICARE

## 2021-02-08 VITALS
BODY MASS INDEX: 25.3 KG/M2 | WEIGHT: 134 LBS | HEIGHT: 61 IN | SYSTOLIC BLOOD PRESSURE: 130 MMHG | TEMPERATURE: 98 F | DIASTOLIC BLOOD PRESSURE: 66 MMHG | HEART RATE: 70 BPM

## 2021-02-08 PROCEDURE — 99214 OFFICE O/P EST MOD 30 MIN: CPT

## 2021-02-08 PROCEDURE — 93000 ELECTROCARDIOGRAM COMPLETE: CPT

## 2021-02-08 NOTE — ASSESSMENT
[FreeTextEntry1] : still get feeling of missed beat although less and w/o syncope\par  was told of extrasystole in pas\par t - thall + CAC score of 11 on statin LDL 92\par   - echo \par Multiple sclerosis\par  Hypercholesterolemia Migraine\par s Factor V Leyden on ASA no venous thrombois hx but daugter has had issues with pregnancy \par Hx of pericarditis\par  1+ AI\par  Trans tele (-) past and Thall (-) past lost 25lbs had colon and egd reported as (-) by pt  and thyroids normal  still rare palp 1 beat no syncope check labs creat and K + normal and renal sono (-) cough with ace and switched to olmersartan and still cough \par \par get  sporadic sob needs deep breath said felt like this with pericarditisCBC ESR and BMP T4 TSH  labs niot avialbale \par can try few doses of alleve to see if decrease sypmtoms since feel like pericarditis as per pt if it is helped could consider colchicine seems like reoslved anfd came back will add colchicine \par pthall (-) \par dizzy in office normal BP and heart rate and ekg seems more neuro related \par no labs available will check esr

## 2021-02-08 NOTE — PHYSICAL EXAM
[General Appearance - Well Developed] : well developed [Normal Appearance] : normal appearance [Well Groomed] : well groomed [General Appearance - Well Nourished] : well nourished [No Deformities] : no deformities [General Appearance - In No Acute Distress] : no acute distress [Normal Conjunctiva] : the conjunctiva exhibited no abnormalities [Eyelids - No Xanthelasma] : the eyelids demonstrated no xanthelasmas [Normal Oral Mucosa] : normal oral mucosa [No Oral Pallor] : no oral pallor [No Oral Cyanosis] : no oral cyanosis [Normal Jugular Venous A Waves Present] : normal jugular venous A waves present [Normal Jugular Venous V Waves Present] : normal jugular venous V waves present [No Jugular Venous Clay A Waves] : no jugular venous clay A waves [Respiration, Rhythm And Depth] : normal respiratory rhythm and effort [Exaggerated Use Of Accessory Muscles For Inspiration] : no accessory muscle use [Auscultation Breath Sounds / Voice Sounds] : lungs were clear to auscultation bilaterally [Heart Rate And Rhythm] : heart rate and rhythm were normal [Heart Sounds] : normal S1 and S2 [Murmurs] : no murmurs present [Abdomen Soft] : soft [Bowel Sounds] : normal bowel sounds [Abdomen Mass (___ Cm)] : no abdominal mass palpated [Nail Clubbing] : no clubbing of the fingernails [Cyanosis, Localized] : no localized cyanosis [] : no ischemic changes [Oriented To Time, Place, And Person] : oriented to person, place, and time

## 2021-02-24 ENCOUNTER — LABORATORY RESULT (OUTPATIENT)
Age: 76
End: 2021-02-24

## 2021-02-24 ENCOUNTER — APPOINTMENT (OUTPATIENT)
Dept: NEUROLOGY | Facility: CLINIC | Age: 76
End: 2021-02-24
Payer: MEDICARE

## 2021-02-24 VITALS
SYSTOLIC BLOOD PRESSURE: 154 MMHG | TEMPERATURE: 98.5 F | WEIGHT: 130 LBS | HEIGHT: 61 IN | OXYGEN SATURATION: 99 % | BODY MASS INDEX: 24.55 KG/M2 | DIASTOLIC BLOOD PRESSURE: 82 MMHG | HEART RATE: 83 BPM

## 2021-02-24 DIAGNOSIS — G35 MULTIPLE SCLEROSIS: ICD-10-CM

## 2021-02-24 PROCEDURE — 99215 OFFICE O/P EST HI 40 MIN: CPT

## 2021-02-24 NOTE — PHYSICAL EXAM
[FreeTextEntry1] : Physical examination:  \par General:   The patient is pleasant, cooperative, well dressed and in no acute distress.  Appearance is consistent with chronologic age.  No abnormal facies.\par Neurologic examination:  The patient is oriented to person, place, time and date.   Remote and recent memory is normal.   Fund of knowledge is intact and normal.  Language with normal repetition, comprehension and naming.  Nondysarthric.   \par Cranial nerves examination: intact VA, VFF.  EOMI w/o nystagmus, skew or reported double vision.  PERRL.  No ptosis/weakness of eyelid closure.  Facial sensation is normal with normal bite.  No facial asymmetry.  Hearing grossly intact b/l.  Palate elevates midline.  Neck flexion/extension, SCM/Trap strength normal.  Tongue midline with full resistance.  \par Motor examination:   Normal tone, bulk and range of motion.  No tenderness, twitching, tremors or involuntary movements.      \par Formal Muscle Strength Testing: (MRC grade R/L) 5/5 JESSICA, BB, TR, WF, WE, FF, FE; 5/5 ILP, QDS, HS, DF, PF.  Arises from sitting/squatting wnl.  Toe/heel walks.  \par Reflexes:   2+ b/l pectoralis, biceps, triceps, brachioradialis, patella and Achilles.  Plantar response downgoing b/l.  Jaw jerk, Ananda, clonus absent.  \par Sensory examination:   Intact to light touch and pinprick, pain, temperature and proprioception and vibration in all extremities.    \par Cerebellum:   FTN/HKS intact with normal LORNA in all limbs.   Gait is narrow based and normal with normal tandem.  Romberg (-).  No dysmetria or dysdiadokinesia. subjective imbalance when standing from sitting.\par \par

## 2021-02-24 NOTE — HISTORY OF PRESENT ILLNESS
[FreeTextEntry1] : This is a 74 yo RHF presenting for transfer of care for longstanding h/o mild RR-MS with initial attack in her 20s previously diagnosed with LP only.  Pt had been stable until her first pregnancy when she had acute exacerbation with ataxia/paraparesis lasting several months.  Symptoms eventually resolved and pt seen by Dr. Gaudencio Betts at [facility] and treated over the last 20 years and is transferring for geographic convenience.  Pt has had occasional relapses and at one point was recommend to take Copaxone but symptoms were mild and far inbetween therefore no DMTs were ever started.  Pt has had serial neuroimaging with small areas of demyelination/gliosis periventricular and prominently in U fiber distribution (images reviewed with pt) but no new areas of enhancement on last MRI in 2019.  Pt has been having burning dysesthesias and paresthesias in b/l UE/LE intermittently that has been ongoing over the last few months.  Denies any focal weakness or ataxia but has been feeling "dizzy" particularly when sitting or standing.  Denies any vertigo or incoordination.  No problems turning in bed.  Pt had been on propranolol 60 mg ER over the last 10 years for headaches and "internal tremors" ad recently started on amlodipine for HTN which made symptoms worse.  Denies any new focal weakness or numbess. Has h/o migraines which she take lamictal 100 mg BID in addition to propranolol,  Had seen ENT for VNG and audiometry and CNS involvement was not ruled out.  Denies any focal ear pain.

## 2021-02-24 NOTE — ASSESSMENT
[FreeTextEntry1] : 76 yo RHF w/ h/o CAD, HTN, DLD, RR-MS currently with recurrent intractable "dizziness" suspect presyncope with multiple anti-HTN.  recommend d/c propranolol since HA controlled with lamictal and no obvious signs of tremors.  Recommend hold tramadol and tizanidine since may exacerbate lightheadedness.  r/o MS exacerbation\par \par Recommendation:\par - d/c propranolol\par - continue lamictal 100 mg BID\par - hold PRN medications\par - MRI Brain w/w/o eugene\par - MRI C-spine w/w/o eugene\par - EMG/NCS LUE/LLE\par - will consider DMTs if repeat neuroimaging with active plaques\par - RTC in 6 - 8 wks for electrodiagnostic studies and f/u\par

## 2021-02-25 LAB
ANION GAP SERPL CALC-SCNC: 10 MMOL/L
BUN SERPL-MCNC: 24 MG/DL
CALCIUM SERPL-MCNC: 10.8 MG/DL
CHLORIDE SERPL-SCNC: 95 MMOL/L
CO2 SERPL-SCNC: 29 MMOL/L
CREAT SERPL-MCNC: 0.8 MG/DL
CRP SERPL-MCNC: <0.1 MG/DL
ERYTHROCYTE [SEDIMENTATION RATE] IN BLOOD BY WESTERGREN METHOD: 16 MM/HR
FOLATE SERPL-MCNC: >20 NG/ML
GLUCOSE SERPL-MCNC: 92 MG/DL
POTASSIUM SERPL-SCNC: 4.7 MMOL/L
SODIUM SERPL-SCNC: 134 MMOL/L
VIT B12 SERPL-MCNC: 929 PG/ML

## 2021-02-26 LAB
ALBUMIN MFR SERPL ELPH: 62.5 %
ALBUMIN SERPL-MCNC: 4.9 G/DL
ALBUMIN/GLOB SERPL: 1.6 RATIO
ALPHA1 GLOB MFR SERPL ELPH: 3.9 %
ALPHA1 GLOB SERPL ELPH-MCNC: 0.3 G/DL
ALPHA2 GLOB MFR SERPL ELPH: 9.5 %
ALPHA2 GLOB SERPL ELPH-MCNC: 0.8 G/DL
B-GLOBULIN MFR SERPL ELPH: 10.4 %
B-GLOBULIN SERPL ELPH-MCNC: 0.8 G/DL
GAMMA GLOB FLD ELPH-MCNC: 1.1 G/DL
GAMMA GLOB MFR SERPL ELPH: 13.7 %
INTERPRETATION SERPL IEP-IMP: NORMAL
M PROTEIN SPEC IFE-MCNC: NORMAL
PROT SERPL-MCNC: 7.9 G/DL
PROT SERPL-MCNC: 7.9 G/DL
THYROGLOB AB SERPL-ACNC: <20 IU/ML
THYROPEROXIDASE AB SERPL IA-ACNC: 35 IU/ML

## 2021-03-03 ENCOUNTER — OUTPATIENT (OUTPATIENT)
Dept: OUTPATIENT SERVICES | Facility: HOSPITAL | Age: 76
LOS: 1 days | Discharge: HOME | End: 2021-03-03
Payer: MEDICARE

## 2021-03-03 ENCOUNTER — RESULT REVIEW (OUTPATIENT)
Age: 76
End: 2021-03-03

## 2021-03-03 DIAGNOSIS — N70.92 OOPHORITIS, UNSPECIFIED: Chronic | ICD-10-CM

## 2021-03-03 DIAGNOSIS — Z87.442 PERSONAL HISTORY OF URINARY CALCULI: Chronic | ICD-10-CM

## 2021-03-03 DIAGNOSIS — Z96.641 PRESENCE OF RIGHT ARTIFICIAL HIP JOINT: Chronic | ICD-10-CM

## 2021-03-03 DIAGNOSIS — G35 MULTIPLE SCLEROSIS: ICD-10-CM

## 2021-03-03 DIAGNOSIS — Z87.19 PERSONAL HISTORY OF OTHER DISEASES OF THE DIGESTIVE SYSTEM: Chronic | ICD-10-CM

## 2021-03-03 LAB
ACE BLD-CCNC: 53 U/L
ALBUPE: 19.3 %
ALPHA1UPE: 25.8 %
ALPHA2UPE: 22.1 %
ANACR T: NEGATIVE
AQP4 H2O CHANNEL AB SERPL IA-ACNC: <1.5 U/ML
B BURGDOR AB SER-IMP: NEGATIVE
B BURGDOR IGM PATRN SER IB-IMP: NEGATIVE
B BURGDOR18KD IGG SER QL IB: NORMAL
B BURGDOR23KD IGG SER QL IB: NORMAL
B BURGDOR23KD IGM SER QL IB: NORMAL
B BURGDOR28KD IGG SER QL IB: NORMAL
B BURGDOR30KD IGG SER QL IB: NORMAL
B BURGDOR31KD IGG SER QL IB: NORMAL
B BURGDOR39KD IGG SER QL IB: NORMAL
B BURGDOR39KD IGM SER QL IB: NORMAL
B BURGDOR41KD IGG SER QL IB: PRESENT
B BURGDOR41KD IGM SER QL IB: PRESENT
B BURGDOR45KD IGG SER QL IB: NORMAL
B BURGDOR58KD IGG SER QL IB: NORMAL
B BURGDOR66KD IGG SER QL IB: PRESENT
B BURGDOR93KD IGG SER QL IB: NORMAL
BETAUPE: 11.6 %
CREAT 24H UR-MCNC: NORMAL G/24 H
CREATININE UR (MAYO): 26 MG/DL
ENA SS-A AB SER IA-ACNC: <0.2 AL
ENA SS-B AB SER IA-ACNC: <0.2 AL
GAMMAUPE: 21.2 %
IGA 24H UR QL IFE: NORMAL
KAPPA LC 24H UR QL: NORMAL
MPO AB + PR3 PNL SER: NORMAL
PROT PATTERN 24H UR ELPH-IMP: NORMAL
PROT UR-MCNC: 4 MG/DL
PROT UR-MCNC: 4 MG/DL
SPECIMEN VOL 24H UR: NORMAL ML

## 2021-03-03 PROCEDURE — 72156 MRI NECK SPINE W/O & W/DYE: CPT | Mod: 26

## 2021-03-03 PROCEDURE — 70553 MRI BRAIN STEM W/O & W/DYE: CPT | Mod: 26

## 2021-03-15 ENCOUNTER — APPOINTMENT (OUTPATIENT)
Dept: CARDIOLOGY | Facility: CLINIC | Age: 76
End: 2021-03-15
Payer: MEDICARE

## 2021-03-15 VITALS
DIASTOLIC BLOOD PRESSURE: 80 MMHG | WEIGHT: 134 LBS | HEART RATE: 99 BPM | SYSTOLIC BLOOD PRESSURE: 130 MMHG | BODY MASS INDEX: 25.3 KG/M2 | HEIGHT: 61 IN | TEMPERATURE: 98.1 F

## 2021-03-15 PROCEDURE — 93000 ELECTROCARDIOGRAM COMPLETE: CPT

## 2021-03-15 PROCEDURE — 99214 OFFICE O/P EST MOD 30 MIN: CPT

## 2021-03-15 RX ORDER — PROPRANOLOL HYDROCHLORIDE 60 MG/1
60 CAPSULE, EXTENDED RELEASE ORAL
Qty: 90 | Refills: 0 | Status: COMPLETED | COMMUNITY
End: 2021-03-15

## 2021-03-15 RX ORDER — PROPRANOLOL HYDROCHLORIDE 80 MG/1
TABLET ORAL
Refills: 0 | Status: COMPLETED | COMMUNITY
End: 2021-03-15

## 2021-03-15 NOTE — ASSESSMENT
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\par  Trans tele (-) past and Thall (-) past lost 25lbs had colon and egd reported as (-) by pt  and thyroids normal  still rare palp 1 beat no syncope check labs creat and K + normal and renal sono (-) cough with ace and switched to olmersartan and still cough \par \par get  sporadic sob needs deep breath said felt like this with pericarditisCBC ESR and BMP T4 TSH  all (-) \par feels better with colcchicne \par pthall (-) \par dizzy in office normal BP and heart rate and ekg seems more neuro related \par saw neuro and rec d/c inderal which then devloped sinus tach and elevated BP so added back inderal at half dose a day to attempt to taper and then still developed elevate BP so added labetolol 200 q12, today pts /80 and HR 98 \par she note does  feel less dizzy off inderal but has been on \par will d/c norvasc and add cardizemm 180 a day if works then could retry hyrin and or hdralazine

## 2021-03-26 ENCOUNTER — NON-APPOINTMENT (OUTPATIENT)
Age: 76
End: 2021-03-26

## 2021-03-31 ENCOUNTER — APPOINTMENT (OUTPATIENT)
Dept: CARDIOLOGY | Facility: CLINIC | Age: 76
End: 2021-03-31
Payer: MEDICARE

## 2021-03-31 VITALS
BODY MASS INDEX: 25.11 KG/M2 | SYSTOLIC BLOOD PRESSURE: 140 MMHG | HEIGHT: 61 IN | WEIGHT: 133 LBS | HEART RATE: 70 BPM | DIASTOLIC BLOOD PRESSURE: 70 MMHG

## 2021-03-31 DIAGNOSIS — I25.10 ATHEROSCLEROTIC HEART DISEASE OF NATIVE CORONARY ARTERY W/OUT ANGINA PECTORIS: ICD-10-CM

## 2021-03-31 DIAGNOSIS — R93.89 ABNORMAL FINDINGS ON DIAGNOSTIC IMAGING OF OTHER SPECIFIED BODY STRUCTURES: ICD-10-CM

## 2021-03-31 DIAGNOSIS — E78.5 HYPERLIPIDEMIA, UNSPECIFIED: ICD-10-CM

## 2021-03-31 PROCEDURE — 93000 ELECTROCARDIOGRAM COMPLETE: CPT

## 2021-03-31 PROCEDURE — 99214 OFFICE O/P EST MOD 30 MIN: CPT

## 2021-03-31 NOTE — PHYSICAL EXAM
[General Appearance - Well Developed] : well developed [Normal Appearance] : normal appearance [Well Groomed] : well groomed [General Appearance - Well Nourished] : well nourished [No Deformities] : no deformities [General Appearance - In No Acute Distress] : no acute distress [Normal Conjunctiva] : the conjunctiva exhibited no abnormalities [Eyelids - No Xanthelasma] : the eyelids demonstrated no xanthelasmas [Normal Oral Mucosa] : normal oral mucosa [No Oral Pallor] : no oral pallor [No Oral Cyanosis] : no oral cyanosis [Normal Jugular Venous A Waves Present] : normal jugular venous A waves present [Normal Jugular Venous V Waves Present] : normal jugular venous V waves present [No Jugular Venous Clay A Waves] : no jugular venous clay A waves [Respiration, Rhythm And Depth] : normal respiratory rhythm and effort [Exaggerated Use Of Accessory Muscles For Inspiration] : no accessory muscle use [Auscultation Breath Sounds / Voice Sounds] : lungs were clear to auscultation bilaterally [Heart Rate And Rhythm] : heart rate and rhythm were normal [Murmurs] : no murmurs present [Heart Sounds] : normal S1 and S2 [Bowel Sounds] : normal bowel sounds [Abdomen Mass (___ Cm)] : no abdominal mass palpated [Abdomen Soft] : soft [Nail Clubbing] : no clubbing of the fingernails [Cyanosis, Localized] : no localized cyanosis [] : no ischemic changes [Oriented To Time, Place, And Person] : oriented to person, place, and time

## 2021-03-31 NOTE — DISCUSSION/SUMMARY
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 mayte

## 2021-04-05 LAB
ANION GAP SERPL CALC-SCNC: 13 MMOL/L
BUN SERPL-MCNC: 27 MG/DL
CALCIUM SERPL-MCNC: 9.8 MG/DL
CHLORIDE SERPL-SCNC: 100 MMOL/L
CO2 SERPL-SCNC: 25 MMOL/L
CREAT SERPL-MCNC: 0.8 MG/DL
GLUCOSE SERPL-MCNC: 98 MG/DL
POTASSIUM SERPL-SCNC: 4.5 MMOL/L
SODIUM SERPL-SCNC: 138 MMOL/L

## 2021-04-06 ENCOUNTER — APPOINTMENT (OUTPATIENT)
Dept: PULMONOLOGY | Facility: CLINIC | Age: 76
End: 2021-04-06
Payer: MEDICARE

## 2021-04-06 VITALS
RESPIRATION RATE: 18 BRPM | SYSTOLIC BLOOD PRESSURE: 134 MMHG | OXYGEN SATURATION: 99 % | HEIGHT: 61 IN | HEART RATE: 87 BPM | WEIGHT: 134 LBS | BODY MASS INDEX: 25.3 KG/M2 | DIASTOLIC BLOOD PRESSURE: 80 MMHG

## 2021-04-06 PROCEDURE — 99213 OFFICE O/P EST LOW 20 MIN: CPT | Mod: 25

## 2021-04-06 PROCEDURE — 71046 X-RAY EXAM CHEST 2 VIEWS: CPT

## 2021-04-08 ENCOUNTER — NON-APPOINTMENT (OUTPATIENT)
Age: 76
End: 2021-04-08

## 2021-04-15 ENCOUNTER — OUTPATIENT (OUTPATIENT)
Dept: OUTPATIENT SERVICES | Facility: HOSPITAL | Age: 76
LOS: 1 days | Discharge: HOME | End: 2021-04-15
Payer: MEDICARE

## 2021-04-15 ENCOUNTER — RESULT REVIEW (OUTPATIENT)
Age: 76
End: 2021-04-15

## 2021-04-15 DIAGNOSIS — Z87.19 PERSONAL HISTORY OF OTHER DISEASES OF THE DIGESTIVE SYSTEM: Chronic | ICD-10-CM

## 2021-04-15 DIAGNOSIS — Z96.641 PRESENCE OF RIGHT ARTIFICIAL HIP JOINT: Chronic | ICD-10-CM

## 2021-04-15 DIAGNOSIS — R06.02 SHORTNESS OF BREATH: ICD-10-CM

## 2021-04-15 DIAGNOSIS — Z87.442 PERSONAL HISTORY OF URINARY CALCULI: Chronic | ICD-10-CM

## 2021-04-15 DIAGNOSIS — N70.92 OOPHORITIS, UNSPECIFIED: Chronic | ICD-10-CM

## 2021-04-15 DIAGNOSIS — R07.9 CHEST PAIN, UNSPECIFIED: ICD-10-CM

## 2021-04-15 PROCEDURE — 75574 CT ANGIO HRT W/3D IMAGE: CPT | Mod: 26,MH

## 2021-04-19 ENCOUNTER — APPOINTMENT (OUTPATIENT)
Dept: CARDIOLOGY | Facility: CLINIC | Age: 76
End: 2021-04-19
Payer: MEDICARE

## 2021-04-19 VITALS
SYSTOLIC BLOOD PRESSURE: 130 MMHG | WEIGHT: 135 LBS | BODY MASS INDEX: 25.49 KG/M2 | DIASTOLIC BLOOD PRESSURE: 80 MMHG | HEART RATE: 83 BPM | HEIGHT: 61 IN | TEMPERATURE: 98 F

## 2021-04-19 PROCEDURE — 93000 ELECTROCARDIOGRAM COMPLETE: CPT

## 2021-04-19 PROCEDURE — 99214 OFFICE O/P EST MOD 30 MIN: CPT

## 2021-04-19 NOTE — REVIEW OF SYSTEMS
james all pertinent systems normal [see HPI] : see HPI [Negative] : Gastrointestinal [Confusion] : no confusion was observed [Anxiety] : no anxiety

## 2021-04-19 NOTE — ASSESSMENT
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\par  Trans tele (-) past and Thall (-) past lost 25lbs had colon and egd reported as (-) by pt  and thyroids normal  still rare palp 1 beat no syncope check labs creat and K + normal and renal sono (-) cough with ace and switched to olmersartan and still cough \par \par get  sporadic sob needs deep breath said felt like this with pericarditisCBC ESR and BMP T4 TSH  all (-) \par feels better with colcchicne \par pthall (-) \par dizzy in office normal BP and heart rate and ekg seems more neuro related \par saw neuro and rec d/c inderal which then devloped sinus tach and elevated BP so added back inderal at half dose a day to attempt to taper and then still developed elevate BP so added labetolol 200 q12, today pts /80 and HR 98 \par she note does  feel less dizzy off inderal but has been on \par will d/c norvasc and add cardizemm 180 a day if works then could retry hyrin and or hdralazine \par still c/o of dizziness and exertional SOB has pulse ox of 99 in office, and (-) thall and echo and nl ESR\par \par Plan Cardiac CCTA (-) nonobstructive \par see pulomonary \par ccta neg and \par will send for tilit table and pulse ox to see if playtpnea alhtough many of syptoms are exertional

## 2021-04-21 ENCOUNTER — APPOINTMENT (OUTPATIENT)
Dept: CARDIOLOGY | Facility: CLINIC | Age: 76
End: 2021-04-21
Payer: MEDICARE

## 2021-04-21 VITALS
RESPIRATION RATE: 16 BRPM | DIASTOLIC BLOOD PRESSURE: 60 MMHG | TEMPERATURE: 98.2 F | OXYGEN SATURATION: 98 % | SYSTOLIC BLOOD PRESSURE: 122 MMHG | BODY MASS INDEX: 25.3 KG/M2 | WEIGHT: 134 LBS | HEART RATE: 70 BPM | HEIGHT: 61 IN

## 2021-04-21 PROCEDURE — 93000 ELECTROCARDIOGRAM COMPLETE: CPT

## 2021-04-21 PROCEDURE — 99204 OFFICE O/P NEW MOD 45 MIN: CPT

## 2021-04-21 RX ORDER — METOPROLOL TARTRATE 25 MG/1
25 TABLET, FILM COATED ORAL
Qty: 1 | Refills: 0 | Status: DISCONTINUED | COMMUNITY
Start: 2021-03-31 | End: 2021-04-21

## 2021-04-21 RX ORDER — COLCHICINE 0.6 MG/1
0.6 TABLET ORAL DAILY
Qty: 90 | Refills: 3 | Status: DISCONTINUED | COMMUNITY
Start: 2021-02-08 | End: 2021-04-21

## 2021-04-21 RX ORDER — CYCLOBENZAPRINE HCL 10 MG
10 TABLET ORAL DAILY
Refills: 0 | Status: DISCONTINUED | COMMUNITY
End: 2021-04-21

## 2021-04-21 RX ORDER — MECLIZINE HYDROCHLORIDE 12.5 MG/1
12.5 TABLET ORAL DAILY
Refills: 0 | Status: DISCONTINUED | COMMUNITY
End: 2021-04-21

## 2021-04-21 RX ORDER — TRAMADOL HYDROCHLORIDE 50 MG/1
50 TABLET, COATED ORAL
Refills: 0 | Status: DISCONTINUED | COMMUNITY
End: 2021-04-21

## 2021-04-21 NOTE — HISTORY OF PRESENT ILLNESS
[FreeTextEntry1] : I had a pleasure of seeing Ms. ZAMORA for initial consultation for dizziness and request for tilt table testing. \par She usually sees Dr. Gonzalez.\par \par Ms. ZAMORA is a 76 year-year old female with history of HTN, DL, multiple sclerosis, pericarditis, factor V laiden mutation is here for ongoing c/o of dizziness since 10/20. Also has CARROLL with some exertion. States that better when she was off inderal, but had to start again due to ?rebound tachycardia.\par Concerns of platypnea\par Seen by pulmonary- no interventions performed\par Neuro and cardio requesting tilt to define etiology of her symptoms.\par \par Drink 4-5 500cc water bottles.\par \par Denies chest pain, shortness of breath, palpitation, dizziness or LOC except noted above.\par \par EKG: SR@70/min\par TTE (01/21): nl EF, mild AR\par MPI (01/21): Nl EF, no reversible defects\par CTA (04/21): , non obstructive CAD\par Cardio/EP: Dr. Gonzalez\par \par

## 2021-04-21 NOTE — ASSESSMENT
[FreeTextEntry1] : ## Dizziness\par ## CARROLL\par \par - Etiology remains unknown.\par - Encouraged increase fluid intake with salt, approximately 3-4 L.\par - Continue meds and management as per primary cardiologist\par - Tilt table test\par - Will sign-off\par - Return as needed\par

## 2021-04-21 NOTE — PHYSICAL EXAM
[General Appearance - Well Developed] : well developed [Normal Appearance] : normal appearance [Well Groomed] : well groomed [General Appearance - Well Nourished] : well nourished [No Deformities] : no deformities [General Appearance - In No Acute Distress] : no acute distress [Normal Conjunctiva] : the conjunctiva exhibited no abnormalities [Eyelids - No Xanthelasma] : the eyelids demonstrated no xanthelasmas [Normal Oral Mucosa] : normal oral mucosa [No Oral Pallor] : no oral pallor [No Oral Cyanosis] : no oral cyanosis [Normal Jugular Venous A Waves Present] : normal jugular venous A waves present [Normal Jugular Venous V Waves Present] : normal jugular venous V waves present [No Jugular Venous Clay A Waves] : no jugular venous clay A waves [Heart Rate And Rhythm] : heart rate and rhythm were normal [Heart Sounds] : normal S1 and S2 [Murmurs] : no murmurs present [Respiration, Rhythm And Depth] : normal respiratory rhythm and effort [Exaggerated Use Of Accessory Muscles For Inspiration] : no accessory muscle use [Auscultation Breath Sounds / Voice Sounds] : lungs were clear to auscultation bilaterally [Abnormal Walk] : normal gait [Gait - Sufficient For Exercise Testing] : the gait was sufficient for exercise testing [Nail Clubbing] : no clubbing of the fingernails [Cyanosis, Localized] : no localized cyanosis [Petechial Hemorrhages (___cm)] : no petechial hemorrhages [Skin Color & Pigmentation] : normal skin color and pigmentation [] : no rash [No Venous Stasis] : no venous stasis [Skin Lesions] : no skin lesions [No Skin Ulcers] : no skin ulcer [No Xanthoma] : no  xanthoma was observed [Oriented To Time, Place, And Person] : oriented to person, place, and time [Affect] : the affect was normal [Mood] : the mood was normal [No Anxiety] : not feeling anxious

## 2021-05-15 ENCOUNTER — OUTPATIENT (OUTPATIENT)
Dept: OUTPATIENT SERVICES | Facility: HOSPITAL | Age: 76
LOS: 1 days | Discharge: HOME | End: 2021-05-15

## 2021-05-15 DIAGNOSIS — Z87.442 PERSONAL HISTORY OF URINARY CALCULI: Chronic | ICD-10-CM

## 2021-05-15 DIAGNOSIS — Z96.641 PRESENCE OF RIGHT ARTIFICIAL HIP JOINT: Chronic | ICD-10-CM

## 2021-05-15 DIAGNOSIS — N70.92 OOPHORITIS, UNSPECIFIED: Chronic | ICD-10-CM

## 2021-05-15 DIAGNOSIS — Z87.19 PERSONAL HISTORY OF OTHER DISEASES OF THE DIGESTIVE SYSTEM: Chronic | ICD-10-CM

## 2021-05-15 DIAGNOSIS — Z11.59 ENCOUNTER FOR SCREENING FOR OTHER VIRAL DISEASES: ICD-10-CM

## 2021-05-18 ENCOUNTER — OUTPATIENT (OUTPATIENT)
Dept: OUTPATIENT SERVICES | Facility: HOSPITAL | Age: 76
LOS: 1 days | Discharge: HOME | End: 2021-05-18

## 2021-05-18 DIAGNOSIS — Z96.641 PRESENCE OF RIGHT ARTIFICIAL HIP JOINT: Chronic | ICD-10-CM

## 2021-05-18 DIAGNOSIS — Z87.442 PERSONAL HISTORY OF URINARY CALCULI: Chronic | ICD-10-CM

## 2021-05-18 DIAGNOSIS — N70.92 OOPHORITIS, UNSPECIFIED: Chronic | ICD-10-CM

## 2021-05-18 DIAGNOSIS — Z87.19 PERSONAL HISTORY OF OTHER DISEASES OF THE DIGESTIVE SYSTEM: Chronic | ICD-10-CM

## 2021-05-18 DIAGNOSIS — R42 DIZZINESS AND GIDDINESS: ICD-10-CM

## 2021-05-18 DIAGNOSIS — R06.02 SHORTNESS OF BREATH: ICD-10-CM

## 2021-05-26 ENCOUNTER — APPOINTMENT (OUTPATIENT)
Dept: CARDIOLOGY | Facility: CLINIC | Age: 76
End: 2021-05-26
Payer: MEDICARE

## 2021-05-26 ENCOUNTER — NON-APPOINTMENT (OUTPATIENT)
Age: 76
End: 2021-05-26

## 2021-05-26 PROCEDURE — ZZZZZ: CPT

## 2021-05-31 ENCOUNTER — RX RENEWAL (OUTPATIENT)
Age: 76
End: 2021-05-31

## 2021-06-01 ENCOUNTER — RESULT REVIEW (OUTPATIENT)
Age: 76
End: 2021-06-01

## 2021-06-01 ENCOUNTER — APPOINTMENT (OUTPATIENT)
Dept: NEUROLOGY | Facility: CLINIC | Age: 76
End: 2021-06-01
Payer: MEDICARE

## 2021-06-01 VITALS — TEMPERATURE: 98.3 F

## 2021-06-01 DIAGNOSIS — R51.9 HEADACHE, UNSPECIFIED: ICD-10-CM

## 2021-06-01 PROCEDURE — 95911 NRV CNDJ TEST 9-10 STUDIES: CPT

## 2021-06-01 PROCEDURE — 95886 MUSC TEST DONE W/N TEST COMP: CPT

## 2021-06-01 PROCEDURE — 99215 OFFICE O/P EST HI 40 MIN: CPT | Mod: 25

## 2021-06-01 NOTE — PROCEDURE
[FreeTextEntry1] : EMG/NCS/RNS: The electrodiagnostic findings are suggestive of a radicular process affecting the left lumbosacral root distribution predominantly involving the left S1 nerve root.  There is currently no evidence for a neuropathic, myopathic or neuromuscular junction disorder.

## 2021-06-01 NOTE — ASSESSMENT
[FreeTextEntry1] : 75 yo RHF w/ h/o CAD, HTN, DLD, RR-MS currently with recurrent intractable intermittent dizziness, dyspnea, paresthesais and headaches with multiple constitutional symptoms with extensive negative neurologic, pulmonary and cardiac workup.  Carries diagnosis of "mild MS" for years with no evidence of new demyelinating plaques and no structural pathology to explain her myriad of symptoms.  At this point, would defer use of DMTs since side effects would likely compound her existing issues.  Recommend additional screening b/w and consider skin Bx and testing for amyloidosis.  \par \par Recommendation:\par - continue lamictal 100 mg BID\par - check additional b/w\par - hold on MS DMTs for now\par - skin Bx\par - RTC in 2 - 4 wks for skin bx\par

## 2021-06-01 NOTE — HISTORY OF PRESENT ILLNESS
[FreeTextEntry1] : Since her last visit, Ms. Ojeda continues to have recurrent episodes of SOB and CARROLL improves with rest.  Continues to have positional lightheadedness.  No diplopia, ptosis, dysphagia or dysarthria.  No focal weakness.  Able to arise from sitting and standing without support.  Denies any LOC or falls.  No  weakness.  Seen Pulmonary and Cardiology with workup all negative per patient including tilt table.  Had malfunction of event monitor awaiting repeat testing. Reports h/o "mild MS" since 1980s with nonspecific recurrences.  Had excessive tearing years ago requiring tear duct stents/procedure of unclear etiology.  Still has intermittent burning dysesthesias.  No persistent numbness.  Tried to decrease lamictal but had to go back to 100 mg BID since HA restarted.  Last set of MRI B/C-spine negative for acute demyelinating plaques.  Had been getting dexamethasone (3X per year) by Dr. Betts at [place] for "mild MS."

## 2021-06-02 LAB
CRP SERPL-MCNC: <3 MG/L
ERYTHROCYTE [SEDIMENTATION RATE] IN BLOOD BY WESTERGREN METHOD: 40 MM/HR
TSH SERPL-ACNC: 1.95 UIU/ML

## 2021-06-04 LAB
THYROGLOB AB SERPL-ACNC: <20 IU/ML
THYROPEROXIDASE AB SERPL IA-ACNC: 60.3 IU/ML

## 2021-06-11 LAB
ACHR BLOCK AB SER QL: 21 %
ACHR MOD AB SER-ACNC: <12 %
ACRM BINDING ANTIBODY: 0.04 NMOL/L
ALBUMIN SERPL ELPH-MCNC: 4.6 G/DL
ALP BLD-CCNC: 61 U/L
ALT SERPL-CCNC: 16 U/L
ANION GAP SERPL CALC-SCNC: 11 MMOL/L
AST SERPL-CCNC: 19 U/L
BILIRUB SERPL-MCNC: 0.6 MG/DL
BUN SERPL-MCNC: 25 MG/DL
CALCIUM SERPL-MCNC: 9.9 MG/DL
CHLORIDE SERPL-SCNC: 98 MMOL/L
CO2 SERPL-SCNC: 27 MMOL/L
CREAT SERPL-MCNC: 1 MG/DL
GLUCOSE SERPL-MCNC: 83 MG/DL
MUSK ANTIBODY TEST: <1 U/ML
POTASSIUM SERPL-SCNC: 4.8 MMOL/L
PROT SERPL-MCNC: 7.2 G/DL
SODIUM SERPL-SCNC: 136 MMOL/L
VGCC-P/Q BIND AB SER-SCNC: 0 PMOL/L

## 2021-06-15 ENCOUNTER — RX RENEWAL (OUTPATIENT)
Age: 76
End: 2021-06-15

## 2021-06-17 ENCOUNTER — OUTPATIENT (OUTPATIENT)
Dept: OUTPATIENT SERVICES | Facility: HOSPITAL | Age: 76
LOS: 1 days | Discharge: HOME | End: 2021-06-17
Payer: MEDICARE

## 2021-06-17 DIAGNOSIS — R53.82 CHRONIC FATIGUE, UNSPECIFIED: ICD-10-CM

## 2021-06-17 DIAGNOSIS — Z87.442 PERSONAL HISTORY OF URINARY CALCULI: Chronic | ICD-10-CM

## 2021-06-17 DIAGNOSIS — N70.92 OOPHORITIS, UNSPECIFIED: Chronic | ICD-10-CM

## 2021-06-17 DIAGNOSIS — Z87.19 PERSONAL HISTORY OF OTHER DISEASES OF THE DIGESTIVE SYSTEM: Chronic | ICD-10-CM

## 2021-06-17 DIAGNOSIS — Z96.641 PRESENCE OF RIGHT ARTIFICIAL HIP JOINT: Chronic | ICD-10-CM

## 2021-06-17 PROCEDURE — G1004: CPT

## 2021-06-17 PROCEDURE — 70496 CT ANGIOGRAPHY HEAD: CPT | Mod: 26,MH

## 2021-06-17 PROCEDURE — 70498 CT ANGIOGRAPHY NECK: CPT | Mod: 26,MG

## 2021-06-21 LAB
MISCELLANEOUS TEST: NORMAL
PROC NAME: NORMAL

## 2021-06-25 ENCOUNTER — APPOINTMENT (OUTPATIENT)
Age: 76
End: 2021-06-25
Payer: MEDICARE

## 2021-06-25 ENCOUNTER — LABORATORY RESULT (OUTPATIENT)
Age: 76
End: 2021-06-25

## 2021-06-25 VITALS
DIASTOLIC BLOOD PRESSURE: 76 MMHG | SYSTOLIC BLOOD PRESSURE: 126 MMHG | BODY MASS INDEX: 24.55 KG/M2 | OXYGEN SATURATION: 99 % | RESPIRATION RATE: 14 BRPM | HEIGHT: 61 IN | WEIGHT: 130 LBS | HEART RATE: 100 BPM

## 2021-06-25 PROCEDURE — 99214 OFFICE O/P EST MOD 30 MIN: CPT

## 2021-06-25 NOTE — HISTORY OF PRESENT ILLNESS
[TextBox_4] : SAW MAC PRIOR, SOB ON EXERTION LAST 6 MONTH, 2 MONTH BEFORE DIZZINESS, SAW CARDIO/ NEURO, NON SMOKER, INCREASE HR NO DESATURATION WITH EXERTION, NON SMOKER

## 2021-06-25 NOTE — DISCUSSION/SUMMARY
[FreeTextEntry1] : SOB ON EXERTION/ NO DESATURATION WITH EXERTION/ INCREASE HR/ SP CT CORONARY/ CXR/ NON SMOKER, PLAN PFT/ EPS F/UP\par CARDIO / LABS/ CHEST CT REVIEWED

## 2021-06-29 ENCOUNTER — APPOINTMENT (OUTPATIENT)
Age: 76
End: 2021-06-29
Payer: MEDICARE

## 2021-06-29 ENCOUNTER — RX RENEWAL (OUTPATIENT)
Age: 76
End: 2021-06-29

## 2021-06-29 PROCEDURE — 94010 BREATHING CAPACITY TEST: CPT

## 2021-06-29 PROCEDURE — 94727 GAS DIL/WSHOT DETER LNG VOL: CPT

## 2021-06-29 PROCEDURE — 94729 DIFFUSING CAPACITY: CPT

## 2021-06-30 ENCOUNTER — APPOINTMENT (OUTPATIENT)
Dept: BREAST CENTER | Facility: CLINIC | Age: 76
End: 2021-06-30
Payer: MEDICARE

## 2021-06-30 VITALS
BODY MASS INDEX: 24.55 KG/M2 | WEIGHT: 130 LBS | SYSTOLIC BLOOD PRESSURE: 122 MMHG | TEMPERATURE: 98.1 F | DIASTOLIC BLOOD PRESSURE: 70 MMHG | HEIGHT: 61 IN

## 2021-06-30 PROCEDURE — 99213 OFFICE O/P EST LOW 20 MIN: CPT

## 2021-07-02 NOTE — ASSESSMENT
[FreeTextEntry1] : Teresita is a 76 postmenopausal F with fibrocystic breast disease. \par \par On exam, she has b/l nondiscrete nodularities palpated throughout both breasts, but no suspicious masses were palpated within either breast.  \par \par Her most recent imaging was performed on 10/28/2020, a b/l mammogram and US, which was unrevealing for any suspicious lesions in either breast, deemed BIRADS 1. \par \par She will be due for a b/l screening mammogram and US On 10/28/2021.  This will be scheduled for her today.  I will have her follow up after imaging. \par \par We discussed dense breasts.  Increasing breast density has been found to increase ones risk of breast cancer, but at this time, there is no clear indication for additional imaging in this setting, as both US and MRI have not been found to improve survival.  One can consider bilateral screening US.  However, out of 1000 women screened, the use of routine US will only identify an additional 3-5 cancers.  The use of US was found to increase the likelihood of undergoing more imaging and more biopsies.  She does have dense breasts.  We have decided to proceed with screening bilateral breast US at this time.  This will be scheduled with her next screening mammogram.\par \par She is otherwise at an average risk for breast cancer and should continue with annual screening mammograms and US. \par \par ALl of her questions were answered.  She knows to call with any further questions or concerns. \par \par PLAN\par -b/l screening mammogram and US 10/28/2021\par -f/up after \par

## 2021-07-02 NOTE — PAST MEDICAL HISTORY
[Menarche Age ____] : age at menarche was [unfilled] [Menopause Age____] : age at menopause was [unfilled] [Total Preg ___] : G[unfilled] [Live Births ___] : P[unfilled]  [Age At Live Birth ___] : Age at live birth: [unfilled] [History of Hormone Replacement Treatment] : has no history of hormone replacement treatment [FreeTextEntry5] : ovarian cystectomy in the past  [FreeTextEntry6] : denies [FreeTextEntry7] : denies [FreeTextEntry8] : yes x 6 months

## 2021-07-02 NOTE — DATA REVIEWED
[FreeTextEntry1] : EXAM:  MG MAMMO SCREEN W LAUREN BI#\par \par \par PROCEDURE DATE:  10/28/2020\par \par \par \par INTERPRETATION:  HISTORY:\par Bilateral MG MAMMO SCREEN W LAUREN BI# was performed. Patient is 75 years old and is seen for screening. The patient has no personal history of cancer. The patient has a history of right needle biopsy in 2014 - benign and right needle biopsy more than 10 years ago - benign. The patient has no family history of breast cancer.\par \par RISK ASSESSMENT:\par NCI Lifetime Risk: 5.5\par Tyrer-Cuzick Lifetime Risk: 4.7\par \par CLINICAL BREAST EXAM:\par The patient reports her last clinical breast exam was performed within the past month.\par \par COMPARISON STUDIES:\par The present examination has been compared to prior imaging studies performed at [facility] on 10/21/2019, and at an outside location on 10/18/2012 and 10/29/2013.\par \par MAMMOGRAM FINDINGS:\par Mammography was performed including the following views: bilateral craniocaudal with tomosynthesis, bilateral mediolateral oblique with tomosynthesis.  The examination includes digital synthetic 2D and digital tomosynthesis 3D images. Additional imaging analysis was performed using CAD (computer-aided detection) software.\par \par The breasts are heterogeneously dense, which may obscure small masses.\par \par No suspicious mass, grouping of calcifications, or other abnormality is identified.\par \par IMPRESSION:\par No mammographic evidence of malignancy.\par \par RECOMMENDATION:\par Unless otherwise indicated by clinical findings, annual screening mammography recommended.\par \par ASSESSMENT:\par BI-RADS Category 1:  Negative\par \par The patient will be notified of these results by telephone, and will also be mailed a written summary in layman's terms.\par \par \par \par \par \par \par \par DELISA LENZ DO; Attending Radiologist\par This document has been electronically signed. Oct 28 2020  6:22PM\par \par EXAM:  MG US BREAST COMPLETE BI\par \par \par PROCEDURE DATE:  10/28/2020\par \par \par \par INTERPRETATION:  Clinical History / Reason for exam: Screening bilateral breast ultrasound.\par \par The patient reports her last clinical breast examination was performed about one month ago.\par \par Family history: There is no family history of breast cancer.\par \par Comparisons: Breast ultrasound dating back to 2018.\par \par Findings:\par \par Ultrasound:\par \par Bilateral whole breast ultrasound was performed.\par \par No solid or cystic mass noted in either breast. No right or left axillary lymphadenopathy.\par \par Impression: No sonographic evidence of malignancy.\par \par Recommendation: Unless otherwise indicated by clinical findings, annual screening mammography recommended.\par \par BI-RADS Category 1: Negative\par \par \par \par \par \par \par \par \par \par DELISA LENZ DO; Attending Radiologist\par This document has been electronically signed. Oct 28 2020  6:23PM\par \par

## 2021-07-02 NOTE — HISTORY OF PRESENT ILLNESS
[FreeTextEntry1] : Teresita presents with fibrocystic changes.  She would like to establish her breast care in [place].  \par \par She has no breast related complaints at this time.  She denies any breast pain, has not palpated any new palpable masses in either breast and denies any nipple discharge or retraction.  Her most recent imaging was performed on 10/21/19, a b/l mammogram and on 19, a b/l US, which was unrevealing for any suspicious lesions in either breast, both studies deemed BIRADS 1. \par \par She has had two prior RIGHT breast biopsies in the past which were reportedly benign. \par \par HISTORICAL RISK FACTORS: \par -no prior breast surgeries \par -no family history of breast or ovarian cancer \par -, age at first live birth was 24\par -no prior OCP use \par -s/p ovarian cystectomy in the past \par \par INTERVAL HISTORY: \par Teresita returns for a 2 year follow up for benign FC changes. \par \par She has no breast related complaints at this time.  She denies any breast pain, has not palpated any new palpable masses in either breast and denies any nipple discharge or retraction.  \par \par Her most recent imaging was performed on 10/28/2020, a b/l mammogram and US, which was unrevealing for any suspicious lesions in either breast, deemed BIRADS 1.

## 2021-07-02 NOTE — PHYSICAL EXAM
[Normocephalic] : normocephalic [Atraumatic] : atraumatic [EOMI] : extra ocular movement intact [No Supraclavicular Adenopathy] : no supraclavicular adenopathy [No Cervical Adenopathy] : no cervical adenopathy [Examined in the supine and seated position] : examined in the supine and seated position [Symmetrical] : symmetrical [No dominant masses] : no dominant masses in right breast  [No dominant masses] : no dominant masses left breast [No Nipple Retraction] : no left nipple retraction [No Nipple Discharge] : no left nipple discharge [No Axillary Lymphadenopathy] : no left axillary lymphadenopathy [Soft] : abdomen soft [Not Tender] : non-tender [No Edema] : no edema [No Rashes] : no rashes [No Ulceration] : no ulceration [de-identified] : multiple b/l nondiscrete nodularities palpated throughout both breasts but no suspicious abnormalities were palpated

## 2021-07-26 ENCOUNTER — APPOINTMENT (OUTPATIENT)
Dept: NEUROLOGY | Facility: CLINIC | Age: 76
End: 2021-07-26
Payer: MEDICARE

## 2021-07-26 DIAGNOSIS — G70.9 MYONEURAL DISORDER, UNSPECIFIED: ICD-10-CM

## 2021-07-26 PROCEDURE — 99215 OFFICE O/P EST HI 40 MIN: CPT

## 2021-07-26 NOTE — ASSESSMENT
[FreeTextEntry1] : 75 yo RHF w/ h/o CAD, HTN, DLD, RR-MS currently with recurrent intractable intermittent dizziness, dyspnea, paresthesias with fatigue, CARROLL and generalized weakness.  Pt found to have multiple serologic markers present including TPO Abs started on synthroid, recent lyme positivity s/p antibiotics.  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  Possible myasthenia w/ LRP4+ therefore recommend trial of pyridostigmine and if responsive, may consider long-term DMTs which may help thryoiditis +/- any underyling rheumatologic disease\par \par Recommendation:\par - taper lamictal by 50 mg Q weekly\par - pyridostigmine tiral w/ 60 mg TID\par - f/u with rheumatology\par - f/u with endocrine regarding incidental thyroid nodule on CTA\par - RTC in 6 - 8 weeks\par - may consider DMT if responds to pyridostigmine\par

## 2021-07-26 NOTE — HISTORY OF PRESENT ILLNESS
[FreeTextEntry1] : Since her last visit, Ms. Ojeda continues to have symptoms of fatigue and generalized weakness with sensation of CARROLL.  Denies any orthopnea, swallowing issues or diplopia.  Still having joint pains in the hands and feet.  Still having intermittent paresthesias.  Has lightheadedness.  Saw Dr. Kapadia from endocrine who repeated b/w found to have "Lyme" and started on empiric antibiotics.  Also started on synthroid since repeat b/w s/o hypothyroid.  Had thyroid u/s which was reportedly normal.  Has appointment with Dr. Jimenez for rheumatologic evaluation.  Currently self-tapering lamictal and is down to 150 mg daily.  Has occasional breakthrough headaches but not as bad as before.

## 2021-07-26 NOTE — PHYSICAL EXAM
[FreeTextEntry1] : NAD.  AOx3.  Intact memory.  Speech fluent, nondysarthric.  CN 2 – 12 normal.\par Strength 5/5 b/l UE/LE.  NL tone, bulk.  No abnl movements.  DTRs 2+ throughout.  Plantar response downgoing b/l.  (-) Hoffmans, clonus.  Sensory intact LT/PP, pain, temp, proprioception and vibration.  NL FTN/HKS.  No dysdiadokinesia.  Gait narrow based/NL tandem.  giveway weakness. \par

## 2021-08-23 ENCOUNTER — NON-APPOINTMENT (OUTPATIENT)
Age: 76
End: 2021-08-23

## 2021-09-05 ENCOUNTER — OUTPATIENT (OUTPATIENT)
Dept: OUTPATIENT SERVICES | Facility: HOSPITAL | Age: 76
LOS: 1 days | Discharge: HOME | End: 2021-09-05
Payer: MEDICARE

## 2021-09-05 DIAGNOSIS — Z96.641 PRESENCE OF RIGHT ARTIFICIAL HIP JOINT: Chronic | ICD-10-CM

## 2021-09-05 DIAGNOSIS — Z87.442 PERSONAL HISTORY OF URINARY CALCULI: Chronic | ICD-10-CM

## 2021-09-05 DIAGNOSIS — N70.92 OOPHORITIS, UNSPECIFIED: Chronic | ICD-10-CM

## 2021-09-05 DIAGNOSIS — M54.9 DORSALGIA, UNSPECIFIED: ICD-10-CM

## 2021-09-05 DIAGNOSIS — Z87.19 PERSONAL HISTORY OF OTHER DISEASES OF THE DIGESTIVE SYSTEM: Chronic | ICD-10-CM

## 2021-09-05 PROCEDURE — 72148 MRI LUMBAR SPINE W/O DYE: CPT | Mod: 26,MH

## 2021-09-08 ENCOUNTER — APPOINTMENT (OUTPATIENT)
Dept: NEUROLOGY | Facility: CLINIC | Age: 76
End: 2021-09-08
Payer: MEDICARE

## 2021-09-08 ENCOUNTER — NON-APPOINTMENT (OUTPATIENT)
Age: 76
End: 2021-09-08

## 2021-09-08 PROCEDURE — 99214 OFFICE O/P EST MOD 30 MIN: CPT

## 2021-09-08 NOTE — ASSESSMENT
[FreeTextEntry1] : 77 yo RHF w/ h/o CAD, HTN, DLD, RR-MS currently with recurrent intractable intermittent dizziness, dyspnea, paresthesias with fatigue, CARROLL and generalized weakness.  Pt found to have multiple serologic markers present including TPO Abs started on synthroid, recent lyme positivity s/p antibiotics.  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  At this point, neurologic w/u has been negative. LRP4+ may be reflection of systemic inflammation and given + thyroid autoantibodies, would suggest systemic inflammation.  Pt remains fixated on diagnosis of MS but pt does not fulfill criteria at this time.  No additional evidence for NMJ disorder and would not explain her myriad of symptoms therefore would defer DMTs at this time.  Discussed with pt and daughter at length, recommend f/u with rheumatology for possible systemic inflammation.  If pt wishes, will send for 2nd opinion with neuromuscular specialist.  \par \par Recommendation:\par - d/c pyridostigmine\par - f/u with rheumatology\par - f/u with endocrine\par - 2nd opinion with neuromuscular specialist if pt wishes\par

## 2021-09-08 NOTE — HISTORY OF PRESENT ILLNESS
[FreeTextEntry1] : Since her last visit, Ms. Ojeda continues to have subjective weakness and myalgias, arthalgias and 5 lbs weight loss with pain diffusely.  Had tried pyridostigmine with worsening nausea and overall feeling "sick" and stopped medications after 3 weeks without any noticeable improvement.  Was seen by endocrine and started on antibiotics for presumed Lyme without any significant improvement.  Had seen Dr. Jimenez recently and is undergoing workup for possible underlying rheumatologic disease.  Denies any focal weakness.

## 2021-09-08 NOTE — PHYSICAL EXAM
[FreeTextEntry1] : NAD.  AOx3.  Intact memory.  Speech fluent, nondysarthric.  CN 2 – 12 normal.\par Strength 5/5 b/l UE/LE.  NL tone, bulk.  No abnl movements.  DTRs 2+ throughout.  Plantar response downgoing b/l.  (-) Hoffmans, clonus.  Sensory intact LT/PP, pain, temp, proprioception and vibration.  NL FTN/HKS.  No dysdiadokinesia.  Gait narrow based/NL tandem.  giveway weakness.  ambulates with cane but barely putting weight on cane.  able to stand from sitting without assist.  reports inability to dorsiflex feet but able to dorsiflex with distraction.  \par

## 2021-09-11 ENCOUNTER — RX RENEWAL (OUTPATIENT)
Age: 76
End: 2021-09-11

## 2021-10-05 ENCOUNTER — NON-APPOINTMENT (OUTPATIENT)
Age: 76
End: 2021-10-05

## 2021-10-11 NOTE — PHYSICAL EXAM
[General Appearance - Well Developed] : well developed [Normal Appearance] : normal appearance [Well Groomed] : well groomed [General Appearance - Well Nourished] : well nourished [No Deformities] : no deformities [General Appearance - In No Acute Distress] : no acute distress [Eyelids - No Xanthelasma] : the eyelids demonstrated no xanthelasmas [Normal Conjunctiva] : the conjunctiva exhibited no abnormalities [Normal Oral Mucosa] : normal oral mucosa [No Oral Pallor] : no oral pallor [No Oral Cyanosis] : no oral cyanosis [Normal Jugular Venous A Waves Present] : normal jugular venous A waves present [Normal Jugular Venous V Waves Present] : normal jugular venous V waves present [No Jugular Venous Clay A Waves] : no jugular venous clay A waves [Respiration, Rhythm And Depth] : normal respiratory rhythm and effort [Exaggerated Use Of Accessory Muscles For Inspiration] : no accessory muscle use [Auscultation Breath Sounds / Voice Sounds] : lungs were clear to auscultation bilaterally [Heart Rate And Rhythm] : heart rate and rhythm were normal [Heart Sounds] : normal S1 and S2 [Murmurs] : no murmurs present [Bowel Sounds] : normal bowel sounds [Abdomen Mass (___ Cm)] : no abdominal mass palpated [Abdomen Soft] : soft [Nail Clubbing] : no clubbing of the fingernails [Cyanosis, Localized] : no localized cyanosis [] : no ischemic changes [Oriented To Time, Place, And Person] : oriented to person, place, and time

## 2021-10-13 ENCOUNTER — APPOINTMENT (OUTPATIENT)
Dept: CARDIOLOGY | Facility: CLINIC | Age: 76
End: 2021-10-13
Payer: MEDICARE

## 2021-10-13 ENCOUNTER — RESULT CHARGE (OUTPATIENT)
Age: 76
End: 2021-10-13

## 2021-10-13 VITALS
HEART RATE: 76 BPM | OXYGEN SATURATION: 98 % | SYSTOLIC BLOOD PRESSURE: 120 MMHG | HEIGHT: 61 IN | DIASTOLIC BLOOD PRESSURE: 60 MMHG | TEMPERATURE: 96.3 F | BODY MASS INDEX: 23.79 KG/M2 | WEIGHT: 126 LBS

## 2021-10-13 DIAGNOSIS — R13.10 DYSPHAGIA, UNSPECIFIED: ICD-10-CM

## 2021-10-13 DIAGNOSIS — I10 ESSENTIAL (PRIMARY) HYPERTENSION: ICD-10-CM

## 2021-10-13 DIAGNOSIS — Z00.00 ENCOUNTER FOR GENERAL ADULT MEDICAL EXAMINATION W/OUT ABNORMAL FINDINGS: ICD-10-CM

## 2021-10-13 PROCEDURE — 93000 ELECTROCARDIOGRAM COMPLETE: CPT

## 2021-10-13 PROCEDURE — 99214 OFFICE O/P EST MOD 30 MIN: CPT

## 2021-10-13 RX ORDER — LAMOTRIGINE 100 MG/1
100 TABLET ORAL TWICE DAILY
Qty: 60 | Refills: 11 | Status: DISCONTINUED | COMMUNITY
Start: 2021-06-29 | End: 2021-10-13

## 2021-10-13 RX ORDER — LABETALOL HYDROCHLORIDE 200 MG/1
200 TABLET, FILM COATED ORAL
Qty: 180 | Refills: 3 | Status: DISCONTINUED | COMMUNITY
Start: 2021-05-31 | End: 2021-10-13

## 2021-10-13 RX ORDER — HYDROCHLOROTHIAZIDE 25 MG/1
25 TABLET ORAL DAILY
Qty: 90 | Refills: 3 | Status: ACTIVE | COMMUNITY
Start: 2021-09-11

## 2021-10-13 RX ORDER — ERGOCALCIFEROL (VITAMIN D2) 1250 MCG
50000 CAPSULE ORAL
Refills: 0 | Status: ACTIVE | COMMUNITY

## 2021-10-13 NOTE — ASSESSMENT
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\par  Trans tele (-) past and Thall (-) past lost 25lbs had colon and egd reported as (-) by pt  and thyroids normal  still rare palp 1 beat no syncope check labs creat and K + normal and renal sono (-) cough with ace and switched to olmersartan and still cough \par \par get  sporadic sob needs deep breath said felt like this with pericarditis CBC ESR and BMP T4 TSH  all (-) \par feels better with colchicine \par pthall (-) \par dizzy in office normal BP and heart rate and ekg seems more neuro related \par saw neuro and rec d/c inderal which then devloped sinus tach and elevated BP so added back inderal at half dose a day to attempt to taper and then still developed elevate BP so added labetolol 200 q12, today pts /80 and HR 98  \par still c/o of dizziness and exertional SOB has pulse ox of 99 in office, and (-) thall and echo and nl ESR\par \par Cardiac CCTA (-) nonobstructive \par see pulomonary no desat reported with exercise by pulmonary \par ccta neg \par will send for tilit table and pulse ox to see if platypnea alhtough many of symptoms are exertional ( -) Tilt for hypotension and pulse ox normal throughout\par  metabolic stress test \par pt seeeing neuromuscular physiscan for 2nd opinion since one test abnormal ? mysvitlana 
No lesions; no rash

## 2021-10-18 ENCOUNTER — RX RENEWAL (OUTPATIENT)
Age: 76
End: 2021-10-18

## 2021-10-29 ENCOUNTER — RESULT REVIEW (OUTPATIENT)
Age: 76
End: 2021-10-29

## 2021-10-29 ENCOUNTER — OUTPATIENT (OUTPATIENT)
Dept: OUTPATIENT SERVICES | Facility: HOSPITAL | Age: 76
LOS: 1 days | Discharge: HOME | End: 2021-10-29
Payer: MEDICARE

## 2021-10-29 DIAGNOSIS — N70.92 OOPHORITIS, UNSPECIFIED: Chronic | ICD-10-CM

## 2021-10-29 DIAGNOSIS — Z12.31 ENCOUNTER FOR SCREENING MAMMOGRAM FOR MALIGNANT NEOPLASM OF BREAST: ICD-10-CM

## 2021-10-29 DIAGNOSIS — Z96.641 PRESENCE OF RIGHT ARTIFICIAL HIP JOINT: Chronic | ICD-10-CM

## 2021-10-29 DIAGNOSIS — Z87.19 PERSONAL HISTORY OF OTHER DISEASES OF THE DIGESTIVE SYSTEM: Chronic | ICD-10-CM

## 2021-10-29 DIAGNOSIS — N60.11 DIFFUSE CYSTIC MASTOPATHY OF RIGHT BREAST: ICD-10-CM

## 2021-10-29 DIAGNOSIS — Z87.442 PERSONAL HISTORY OF URINARY CALCULI: Chronic | ICD-10-CM

## 2021-10-29 PROCEDURE — 77063 BREAST TOMOSYNTHESIS BI: CPT | Mod: 26

## 2021-10-29 PROCEDURE — 77067 SCR MAMMO BI INCL CAD: CPT | Mod: 26

## 2021-10-29 PROCEDURE — 76641 ULTRASOUND BREAST COMPLETE: CPT | Mod: 26,50

## 2021-10-31 ENCOUNTER — RX RENEWAL (OUTPATIENT)
Age: 76
End: 2021-10-31

## 2021-10-31 RX ORDER — DILTIAZEM HYDROCHLORIDE 180 MG/1
180 CAPSULE, EXTENDED RELEASE ORAL
Qty: 90 | Refills: 3 | Status: ACTIVE | COMMUNITY
Start: 2021-10-31 | End: 1900-01-01

## 2021-11-09 ENCOUNTER — APPOINTMENT (OUTPATIENT)
Dept: BREAST CENTER | Facility: CLINIC | Age: 76
End: 2021-11-09
Payer: MEDICARE

## 2021-11-09 VITALS
DIASTOLIC BLOOD PRESSURE: 77 MMHG | WEIGHT: 126 LBS | TEMPERATURE: 97.2 F | HEIGHT: 61 IN | BODY MASS INDEX: 23.79 KG/M2 | SYSTOLIC BLOOD PRESSURE: 129 MMHG

## 2021-11-09 PROCEDURE — 99212 OFFICE O/P EST SF 10 MIN: CPT

## 2021-11-09 NOTE — ASSESSMENT
[FreeTextEntry1] : Teresita is a 76 postmenopausal F with fibrocystic breast disease. \par \par On exam, she has b/l nondiscrete nodularities palpated throughout both breasts, but no suspicious masses were palpated within either breast.  \par \par Her most recent imaging was performed on 10/29/21, a b/l mammogram and US, which was unrevealing for any suspicious lesions in either breast, deemed BIRADS 1. \par \par She will be due for a b/l screening mammogram and US On 10/30/2022.  This will be scheduled for her today.  I will have her follow up after imaging. \par \par We discussed dense breasts.  Increasing breast density has been found to increase ones risk of breast cancer, but at this time, there is no clear indication for additional imaging in this setting, as both US and MRI have not been found to improve survival.  One can consider bilateral screening US.  However, out of 1000 women screened, the use of routine US will only identify an additional 3-5 cancers.  The use of US was found to increase the likelihood of undergoing more imaging and more biopsies.  She does have dense breasts.  We have decided to proceed with screening bilateral breast US at this time.  This will be scheduled with her next screening mammogram.\par \par She is otherwise at an average risk for breast cancer and should continue with annual screening mammograms and US. \par \par ALl of her questions were answered.  She knows to call with any further questions or concerns. \par \par PLAN\par -b/l screening mammogram and US 10/30/2022\par -f/up after \par

## 2021-11-09 NOTE — PHYSICAL EXAM
[Normocephalic] : normocephalic [Atraumatic] : atraumatic [EOMI] : extra ocular movement intact [Examined in the supine and seated position] : examined in the supine and seated position [Symmetrical] : symmetrical [No dominant masses] : no dominant masses in right breast  [No dominant masses] : no dominant masses left breast [No Nipple Retraction] : no left nipple retraction [No Nipple Discharge] : no left nipple discharge [No Axillary Lymphadenopathy] : no left axillary lymphadenopathy [No Edema] : no edema [No Rashes] : no rashes [No Ulceration] : no ulceration [de-identified] : On physical exam, there are no discrete masses in either breast or axilla. There is no nipple discharge or inversion bilaterally. There are no skin changes bilaterally.\par multiple b/l nondiscrete nodularities palpated throughout both breasts but no suspicious abnormalities were palpated \par

## 2021-11-09 NOTE — DATA REVIEWED
[FreeTextEntry1] : EXAM:  MG MAMMO SCREEN W LAUREN BI#\par \par \par PROCEDURE DATE:  10/29/2021\par \par \par \par INTERPRETATION:  HISTORY:\par Bilateral MG MAMMO SCREEN W LAUREN BI# was performed. Patient is 76 years old and is seen for screening. The patient has no personal history of cancer. The patient has a history of right needle biopsy in 2014 - benign and right needle biopsy more than 10 years ago - benign. The patient has no family history of breast cancer.\par \par RISK ASSESSMENT:\par NCI Lifetime Risk: 5.1\par Tyrer-Cuzick Lifetime Risk: 2.9\par \par CLINICAL BREAST EXAM:\par The patient reports her last clinical breast exam was performed 4 months ago.\par \par COMPARISON STUDIES:\par The present examination has been compared to prior imaging studies performed at [facility] on 10/21/2019 and 10/28/2020, and at an outside location on 10/29/2013.\par \par MAMMOGRAM FINDINGS:\par Mammography was performed including the following views: bilateral craniocaudal with tomosynthesis, bilateral mediolateral oblique with tomosynthesis.  The examination includes digital synthetic 2D and digital tomosynthesis 3D images. Additional imaging analysis was performed using CAD (computer-aided detection) software.\par \par The breasts are heterogeneously dense, which may obscure small masses.\par \par No suspicious mass, grouping of calcifications, or other abnormality is identified.\par \par IMPRESSION:\par No mammographic evidence of malignancy.\par \par RECOMMENDATION:\par Unless otherwise indicated by clinical findings, annual screening mammography recommended.\par \par ASSESSMENT:\par BI-RADS Category 1:  Negative\par \par EXAM:  MG US BREAST COMPLETE BI\par \par \par PROCEDURE DATE:  10/29/2021\par \par \par \par INTERPRETATION:  Clinical History / Reason for exam: Screening bilateral breast ultrasound.\par \par The patient reports her last clinical breast examination was performed about: 4 months ago.\par \par Family history of breast cancer: There is no family history of breast cancer.\par \par Comparisons: Breast ultrasound dating back to 2019.\par \par Breast composition: The breasts are heterogeneously dense, which may obscure small masses.\par \par Findings:\par \par Ultrasound:\par \par Bilateral whole breast ultrasound was performed.\par \par No solid or cystic mass noted in either breast. No right or left axillary lymphadenopathy.\par \par Impression: No sonographic evidence of malignancy.\par \par Recommendation: Unless otherwise indicated by clinical findings, annual screening mammography recommended.\par \par BI-RADS Category 1: Negative\par \par \par

## 2021-11-09 NOTE — HISTORY OF PRESENT ILLNESS
[FreeTextEntry1] : Teresita presents with fibrocystic changes.  She would like to establish her breast care in [place].  \par \par She has no breast related complaints at this time.  She denies any breast pain, has not palpated any new palpable masses in either breast and denies any nipple discharge or retraction.  Her most recent imaging was performed on 10/21/19, a b/l mammogram and on 19, a b/l US, which was unrevealing for any suspicious lesions in either breast, both studies deemed BIRADS 1. \par \par She has had two prior RIGHT breast biopsies in the past which were reportedly benign. \par \par HISTORICAL RISK FACTORS: \par -no prior breast surgeries \par -no family history of breast or ovarian cancer \par -, age at first live birth was 24\par -no prior OCP use \par -s/p ovarian cystectomy in the past \par \par INTERVAL HISTORY: \par Teresita returns for a 2 year follow up for benign FC changes. \par \par She has no breast related complaints at this time.  She denies any breast pain, has not palpated any new palpable masses in either breast and denies any nipple discharge or retraction.  \par \par Her most recent imaging was performed on 10/28/2020, a b/l mammogram and US, which was unrevealing for any suspicious lesions in either breast, deemed BIRADS 1. \par \par INTERVAL HISTORY: \par Teresita returns for a follow up for benign FC changes. \par \par She has no breast related complaints at this time.  She denies any breast pain, has not palpated any new palpable masses in either breast and denies any nipple discharge or retraction.  \par \par Her most recent imaging was performed on 10/29/2021, a b/l mammogram and US, which was unrevealing for any suspicious lesions in either breast, deemed BIRADS 1.

## 2021-11-12 ENCOUNTER — APPOINTMENT (OUTPATIENT)
Age: 76
End: 2021-11-12
Payer: MEDICARE

## 2021-11-12 VITALS
OXYGEN SATURATION: 99 % | SYSTOLIC BLOOD PRESSURE: 120 MMHG | HEART RATE: 103 BPM | WEIGHT: 126 LBS | RESPIRATION RATE: 14 BRPM | HEIGHT: 61 IN | BODY MASS INDEX: 23.79 KG/M2 | DIASTOLIC BLOOD PRESSURE: 70 MMHG

## 2021-11-12 PROCEDURE — 99213 OFFICE O/P EST LOW 20 MIN: CPT

## 2021-11-12 RX ORDER — METHOTREXATE 2.5 MG/1
2.5 TABLET ORAL
Refills: 0 | Status: COMPLETED | COMMUNITY
End: 2021-11-12

## 2021-11-12 NOTE — HISTORY OF PRESENT ILLNESS
[TextBox_4] : SAW MAC PRIOR, SOB ON EXERTION LAST 6 MONTH, STILL CO DIZZINESS, SAW CARDIO/ NEURO, NON SMOKER, INCREASE HR NO DESATURATION WITH EXERTION, NON SMOKER. PFT's were NL, were normal. Patient frustrated due to lack of diagnosis for shortness of breath on exertion that is nonresolving.\par NO COUGH, SP TILT TEST\par

## 2021-11-12 NOTE — DISCUSSION/SUMMARY
[FreeTextEntry1] : SOB ON EXERTION/ NO DESATURATION WITH EXERTION/ INCREASE HR/ SP CT CORONARY/ CXR/ NON SMOKER, \par PFT's wnl\par CPET to be planned\par CT Chest NC ordered\par Neuro FU\par Cardio FU\par Rheum FU

## 2021-11-13 ENCOUNTER — RESULT REVIEW (OUTPATIENT)
Age: 76
End: 2021-11-13

## 2021-11-13 ENCOUNTER — OUTPATIENT (OUTPATIENT)
Dept: OUTPATIENT SERVICES | Facility: HOSPITAL | Age: 76
LOS: 1 days | Discharge: HOME | End: 2021-11-13
Payer: MEDICARE

## 2021-11-13 DIAGNOSIS — N70.92 OOPHORITIS, UNSPECIFIED: Chronic | ICD-10-CM

## 2021-11-13 DIAGNOSIS — R06.02 SHORTNESS OF BREATH: ICD-10-CM

## 2021-11-13 DIAGNOSIS — Z96.641 PRESENCE OF RIGHT ARTIFICIAL HIP JOINT: Chronic | ICD-10-CM

## 2021-11-13 DIAGNOSIS — Z87.19 PERSONAL HISTORY OF OTHER DISEASES OF THE DIGESTIVE SYSTEM: Chronic | ICD-10-CM

## 2021-11-13 DIAGNOSIS — Z87.442 PERSONAL HISTORY OF URINARY CALCULI: Chronic | ICD-10-CM

## 2021-11-13 PROCEDURE — 71250 CT THORAX DX C-: CPT | Mod: 26,MH

## 2021-11-15 ENCOUNTER — APPOINTMENT (OUTPATIENT)
Dept: NEUROLOGY | Facility: CLINIC | Age: 76
End: 2021-11-15
Payer: MEDICARE

## 2021-11-15 VITALS
OXYGEN SATURATION: 98 % | SYSTOLIC BLOOD PRESSURE: 161 MMHG | DIASTOLIC BLOOD PRESSURE: 79 MMHG | BODY MASS INDEX: 23.79 KG/M2 | HEIGHT: 61 IN | HEART RATE: 105 BPM | TEMPERATURE: 96.4 F | WEIGHT: 126 LBS

## 2021-11-15 DIAGNOSIS — G50.1 ATYPICAL FACIAL PAIN: ICD-10-CM

## 2021-11-15 PROCEDURE — 99214 OFFICE O/P EST MOD 30 MIN: CPT

## 2021-11-15 RX ORDER — PYRIDOSTIGMINE BROMIDE 60 MG/1
60 TABLET ORAL
Qty: 90 | Refills: 2 | Status: DISCONTINUED | COMMUNITY
Start: 2021-07-26 | End: 2021-11-15

## 2021-11-15 NOTE — HISTORY OF PRESENT ILLNESS
[FreeTextEntry1] : Since her last visit, Ms. Ojeda continues to have SOB on exertion despite normal PFT testing and normal Pulmonary f/u.  Sees Dr. Jimenez and getting methotrexate and prednisone for inflammatory arthritis and possibly switching to humira.  Currently getting treatment for thyroiditis with Dr. Kapadia.  May be getting "metabolic stress test" with cardiology but has not been scheduled.  Denies any new symptoms.  Unable to fully participate with PT due to SOB/pain and LROM.  Does not do exercises at home.  Reports problems with ataxia and weakness but able to ambulate independently without assistance.

## 2021-11-15 NOTE — ASSESSMENT
[FreeTextEntry1] : 75 yo RHF w/ h/o CAD, HTN, DLD, RR-MS currently with recurrent intractable intermittent dizziness, dyspnea, paresthesias with fatigue, CARROLL and generalized weakness.  Pt found to have multiple serologic markers present including TPO Abs started on synthroid, recent lyme positivity s/p antibiotics.  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  At this point, neurologic w/u has been negative. LRP4+ may be reflection of systemic inflammation and given + thyroid autoantibodies, would suggest systemic inflammation.  Pt remains fixated on diagnosis of MS but pt does not fulfill criteria at this time.  No additional evidence for NMJ disorder and would not explain her myriad of symptoms therefore would defer DMTs at this time.  Reiterated that objective strength testing and overall clinical course has been improvement with negative neurologic and pulmonary studies.  Rheumatologic and endocrine issues have systemic effects and may cause subjective issues.  Reiterated that use of DMTs by rheumatologic does not supplant the need for exercise and physical therapy to augment her stamina and increase muscle strength.  Pt understands and agrees to attempt regular structured exercise.  Pt seen by OMFS who suggested ? neuralgia from root canal therapy \par \par Recommendation:\par - f/u with rheumatology for med adjustment\par - f/u with endocrine\par - regular structured exercise and continue PT\par - f/u with cardiology for additional stress tests if needed\par - start nortriptyline 10 mg QHS and increase to 20 mg QHS for neuralgia as requested by OMFS\par - 2nd opinion with neuromuscular specialist if pt wishes\par - RTC in 4 months\par

## 2021-11-15 NOTE — PHYSICAL EXAM
[FreeTextEntry1] : NAD.  AOx3.  Intact memory.  Speech fluent, nondysarthric.  CN 2 – 12 normal.\par Strength 5/5 b/l UE/LE.  NL tone, bulk.  No abnl movements.  DTRs 2+ throughout.  Plantar response downgoing b/l.  (-) Hoffmans, clonus.  Sensory intact LT/PP, pain, temp, proprioception and vibration.  NL FTN/HKS.  No dysdiadokinesia.  Gait narrow based/NL tandem.  giveway weakness.  ambulates with cane but barely putting weight on cane.  able to stand from sitting without assist.  reports inability to dorsiflex feet but able to dorsiflex with distraction.  C/o weakness but able to shrug shoulders up agains full resistance when she tries.  Same with proximal UE/LE testing.  C/o problems with weakness and fatigue but able to jump off exam table to grab her cell phone.  \par

## 2022-01-03 ENCOUNTER — APPOINTMENT (OUTPATIENT)
Dept: NEUROSURGERY | Facility: CLINIC | Age: 77
End: 2022-01-03
Payer: MEDICARE

## 2022-01-10 ENCOUNTER — APPOINTMENT (OUTPATIENT)
Dept: NEUROSURGERY | Facility: CLINIC | Age: 77
End: 2022-01-10
Payer: MEDICARE

## 2022-01-10 VITALS — HEIGHT: 61 IN | WEIGHT: 128 LBS | BODY MASS INDEX: 24.17 KG/M2

## 2022-01-10 PROCEDURE — 99213 OFFICE O/P EST LOW 20 MIN: CPT

## 2022-01-10 NOTE — REASON FOR VISIT
[New Patient Visit] : a new patient visit [Referred By: _________] : Patient was referred by HODAN [Family Member] : family member

## 2022-01-11 NOTE — HISTORY OF PRESENT ILLNESS
[de-identified] : 75 yo female comes to the office today at the recommendation of Dr Cordero. Patient is being followed by Dr Jimenez for inflammatory arthritis; currently taking methotrexate and prednisone, but may be switched to Humira. Being followed by Dr Kapadia for thyroiditis.  Patient has been having LEFT leg symptoms which have gotten worse. States she has pain radiating along the anterior LEFT thigh to the lateral calf and foot with associated numbness, tingling, burning and temperature changes in both of her feet. She has been doing PT for over 5 months without sustained relief. Feels as though her mobility is getting worse and has required the use of a rollator for the past 2 months.  Underwent an RAKAN 3 weeks ago with Dr Cordero, which did alleviate the burning and temperature changes in her feet. However, the pain in the LEFT leg persists, with difficulty ambulating. \par \par Denies any bowel or bladder incontinence at this time. \par \par Currently participates in PT 3 times a week. Is scheduled to followup with Dr Cordero after her visit today.\par \par Rx: Flexeril 5mg at night; Tramadol 50mg prn pain\par \par IMAGING: [facility] MRI LS wo 9/5/21 demonstrates a far lateral disc herniation on the LEFT at L5-S1 .There is multilevel ligamentous hypertrophy with associated facet hypertrophy causing central canal stenosis.\par \par PE:\par Constitutional: Well appearing, no distress sitting on the chair in mild discomfort; here with daughter -in-law\par HEENT: Normocephalic Atraumatic, sclerae anicteric, mucus membranes moist, trachea midline\par Psychiatric: Alert and oriented to all spheres, normal mood\par Pulmonary: No respiratory distress or accessory muscle use\par \par Neurologic:\par CN II-XII grossly intact \par Palpation: no pain to palpation\par Strength: Decreased strength LEFT knee extension 3/5, dorsi/plantar flexion; 3-/5, no atrophy; RIGHT plantar/dorsiflexion 4-/5\par Sensation: Decreased sensation to light touch along LEFT L5-S1 distribution when compared to the right. \par Reflexes:\par 2+ patellar\par 2+ biceps\par 2+ ankle jerk\par No Kapadia's\par No clonus\par No babinski\par \par Signs:\par SLR POSITIVE on the LEFT; Jak's maneuver negative\par \par Gait: Antalgic with rollator\par

## 2022-01-11 NOTE — PLAN
[FreeTextEntry1] : While I significant portion of Teresita's seymptoms are nto attributable to her her left L5-S1 foraminal disc herniation I do believe she is have concordant radiculopathy on her left side. She has bilateral foot weakness and numbness which I think is unrelated. I have recommended she undergo left L5-S1 transforaminal injection. This may be therapeutic and diagnostic. she will do exercises at home and instructions were provided as PT is not reasonable for her right now given her covid surge and the ? of immunosupression in this patient. She will follow up in 6-8 weeks at which point we will see if she has improvement. We did discuss surgical intervention with left L5-S1 far lateral microdiscectomy. She will consider if she has no improvement with further conservative measures.

## 2022-01-11 NOTE — REVIEW OF SYSTEMS
[As Noted in HPI] : as noted in HPI [Negative] : Psychiatric [Fever] : no fever [Chills] : no chills [Eye Pain] : no eye pain [Red Eyes] : eyes not red [Earache] : no earache [Loss Of Hearing] : no hearing loss [Chest Pain] : no chest pain [Palpitations] : no palpitations

## 2022-02-17 ENCOUNTER — RX RENEWAL (OUTPATIENT)
Age: 77
End: 2022-02-17

## 2022-02-23 ENCOUNTER — APPOINTMENT (OUTPATIENT)
Dept: NEUROSURGERY | Facility: CLINIC | Age: 77
End: 2022-02-23

## 2022-03-07 ENCOUNTER — APPOINTMENT (OUTPATIENT)
Dept: NEUROLOGY | Facility: CLINIC | Age: 77
End: 2022-03-07
Payer: MEDICARE

## 2022-03-07 VITALS
SYSTOLIC BLOOD PRESSURE: 148 MMHG | HEIGHT: 60.5 IN | BODY MASS INDEX: 24.87 KG/M2 | WEIGHT: 130 LBS | TEMPERATURE: 98.2 F | DIASTOLIC BLOOD PRESSURE: 80 MMHG | HEART RATE: 100 BPM | OXYGEN SATURATION: 100 %

## 2022-03-07 DIAGNOSIS — M51.16 INTERVERTEBRAL DISC DISORDERS WITH RADICULOPATHY, LUMBAR REGION: ICD-10-CM

## 2022-03-07 DIAGNOSIS — R42 DIZZINESS AND GIDDINESS: ICD-10-CM

## 2022-03-07 PROCEDURE — 99214 OFFICE O/P EST MOD 30 MIN: CPT

## 2022-03-07 NOTE — HISTORY OF PRESENT ILLNESS
[FreeTextEntry1] : Referred by Dr. Solano for second opinion regarding various symptoms\par She describes light-headedness and shortness of breath, starting about a year ago, triggered by exertion, going up stairs\par Lightheadedness can occur when she gets out of bed \par Cardiac workup has been unremarkable - echo normal, CCTA unremarkable, tilt table normal \par She denies double vision, dysphagia, dysarthria\par She also complains of numbness, tingling and burning pain down the left leg, freezing cold sensation; she denies back pain \par She had an epidural but had a bad reaction - worse pain afterward\par \par She was diagnosed with MS (reportedly by an LP) in her 20s\par \par She tried mestinon without improvement \par \par Reviewed:\par Labs - LRP4+, AchR and MusK negative \par Tilt table test normal\par PFTs normal \par \par Personally reviewed and interpreted:\par MRI brain normal\par MRI L spine - left foraminal narrowing at L4/5 and L5/S1 with compression of exiting L4 and L5 nerve roots

## 2022-03-07 NOTE — PHYSICAL EXAM
[FreeTextEntry1] : CN: PERRL, EOMI, no ptosis, facial strength and sensation intact and symmetric, palate elevation symmetric, tongue midline, no tongue atrophy or fasciculations\par \par Motor: normal bulk and tone; poor effort on strength testing of LLE, somewhat better with encouragement; RLE and b/l UE 5/5 strength\par \par Sensory: light touch and pinprick diminished in left anterolateral lower leg and dorsum of foot, although somewhat inconsistent; otherwise intact and symmetric throughout\par \par Reflexes: 2+ symmetric throughout, no Kapadia’s sign, plantar responses flexor bilaterally\par \par Coordination: no dysmetria on finger to nose, Romberg negative\par \par Gait: narrow base, slow short steps, not ataxic, no shuffling or freezing

## 2022-03-07 NOTE — ASSESSMENT
[FreeTextEntry1] : Left leg pain / paresthesias likely due to lumbar radiculopathy, either L4 or L5 based on MRI findings, more likely L5 given distribution of sensory abnormalities on exam. Her apparent weakness in the LLE, however, does not fit into any particular nerve root distribution, and likely is a manifestation of pain, not true weakness. It would be reasonable to pursue surgical decompression for lumbar radiculopathy if there are no contraindications to surgery. \par \par Lightheadedness / dyspnea of unclear etiology. She has no other symptoms of myasthenia, thus LRP4 is likely a false positive. Lightheadedness is not a symptom of myasthenia. PFTs were normal - if there were neuromuscular respiratory weakness there should have been a restrictive pattern on PFTs. Autonomic testing / tilt table was normal indicating no orthostatic hypotension, POTS, or other dysautonomia. Thus a neurologic etiology for these symptoms is unlikely. No further workup is recommended. \par \par I agree that imaging is not consistent with multiple sclerosis; her symptoms and exam are also not consistent with that.

## 2022-03-07 NOTE — CONSULT LETTER
[Dear  ___] : Dear  [unfilled], [Consult Letter:] : I had the pleasure of evaluating your patient, [unfilled]. [Please see my note below.] : Please see my note below. [Consult Closing:] : Thank you very much for allowing me to participate in the care of this patient.  If you have any questions, please do not hesitate to contact me. [Sincerely,] : Sincerely, [FreeTextEntry3] : Vinay Marquez M.D.\par Neurology, Electromyography and Neuromuscular Medicine\par [facility]\par \par  of Neurology\par [facility] [employer]

## 2022-03-24 ENCOUNTER — RX RENEWAL (OUTPATIENT)
Age: 77
End: 2022-03-24

## 2022-03-29 ENCOUNTER — APPOINTMENT (OUTPATIENT)
Dept: CARDIOLOGY | Facility: CLINIC | Age: 77
End: 2022-03-29

## 2022-03-31 ENCOUNTER — APPOINTMENT (OUTPATIENT)
Dept: NEUROLOGY | Facility: CLINIC | Age: 77
End: 2022-03-31
Payer: MEDICARE

## 2022-03-31 VITALS
SYSTOLIC BLOOD PRESSURE: 165 MMHG | WEIGHT: 130 LBS | OXYGEN SATURATION: 99 % | HEIGHT: 60 IN | BODY MASS INDEX: 25.52 KG/M2 | HEART RATE: 107 BPM | DIASTOLIC BLOOD PRESSURE: 82 MMHG

## 2022-03-31 PROCEDURE — 99214 OFFICE O/P EST MOD 30 MIN: CPT

## 2022-03-31 NOTE — PHYSICAL EXAM
[FreeTextEntry1] : GEN: NAD, pleasant, cooperative\par CVS: RRR, no carotid bruit\par CHEST: No signs of  distress, on room air\par ABD: Soft, NTTP\par NEURO: \par   MENTAL STATUS: AAOx3\par   LANG/SPEECH: Fluent, intact naming, repetition & comprehension\par   CRANIAL NERVES:\par   II: Pupils equal and reactive, no RAPD, normal visual field and fundus\par   III, IV, VI: EOM intact, no gaze preference or deviation\par   V: normal\par   VII: no facial asymmetry\par   VIII: normal hearing to speech\par   MOTOR: 5/5 b/l UE. On the LE exam she refuses to move LLE due to "weakness" but she was able to walk to the room and also sit on the table without difficulty. She showed resistance against passive movement of LLE in the beginning. \par   REFLEXES: 2/4 throughout, bilateral flexor plantars\par   SENSORY: Normal to touch, temperature & pin prick in all extremities\par   COORD: Normal finger to nose and heel to shin, no tremor, no dysmetria \par  Gait: Normal stance, retropulsion test normal. \par \par (on my exam- pt reports severe weakness to point of unable to bear weight on the left leg but was able to ambulate independently into and out of office with minimal use of rollator. able to stand from sitting with arms crossed.  able to heel and toe walk.  with retropulsion able to independently catch herself from falling.  but when asked to elevate her LLE or extend her knee she reports being unable to.)

## 2022-03-31 NOTE — HISTORY OF PRESENT ILLNESS
[FreeTextEntry1] : 75 yo RHF w/ h/o CAD, HTN, DLD, RR-MS, presented for follow up. \par She complains of "left leg pain". The pain is on the lateral side of the hip radiates to foot, with burning, tingling and numbness, and stiffness, 8/10 pain. Happens at both sitting and running. Symptoms started about 6 months ago, she has an appointment with neurosurgery for endoscopic microdiscectomy/foraminotomy due to left L5 radiculopathy. States the symptoms worsen since lumbar injection around November.\par She is also complains of shortness of breath, palpitation, dizziness, s/p exertion, PFTs normal. Cardiology work up was negative, possible future heart monitor at [facility]. \par \par Sees Dr. Jimenez and getting methotrexate and prednisone, Simponi, and Prolia  for inflammatory arthritis.\par Pt saw neurosurgeon Dr. Salazar who recommended conservative management, then went to [facility] to see Dr. Cooley who recommended L-sided decompression.  Had seen [facility] neurologist who recommended repeating EMG/NCS but Dr. Cooley did not feel necessary and has already schedule pt for surgery in April.  Had seen Dr. Marquez who agreed that pt unlikely to have neuromuscular junction disease and recommended taking care of LLE/LBP first.

## 2022-03-31 NOTE — DISCUSSION/SUMMARY
[FreeTextEntry1] : 75 yo RHF w/ h/o CAD, HTN, DLD, RR-MS (never confirmed), presented for follow up, she complains of LLE pain and have an appointment with neurosurgery for endoscopic microdiscectomy/foraminotomy due to left L5 radiculopathy. The exam is inconsistent and reported weakness out of proportion to findings on EMG/NCS and on neuroimaging.  Discussed this with patient but she is adamant that she wants surgical decompression for "weakness."  Recommend having her f/u with S surgeons if she wants and if still has persistent symptoms, may be beneficial to have f/u her neurologist at [facility] to help coordinate post-operative care. \par \par Plan: \par - f/u with rheumatology for DMT management\par - Follow up with [facility] as patient wishes\par - No neurologic intervention at this time. \par - If symptoms persists after surgery, recommend to follow up with [facility] neurology team. \par - RTC PRN

## 2022-03-31 NOTE — REVIEW OF SYSTEMS
[Dizziness] : dizziness [Lightheadedness] : lightheadedness [Difficulty Walking] : difficulty walking [As Noted in HPI] : as noted in HPI [Fever] : no fever [Chills] : no chills [Feeling Poorly] : not feeling poorly [Feeling Tired] : not feeling tired [Seizures] : no convulsions [Fainting] : no fainting [Vertigo] : no vertigo [Cluster Headache] : no cluster headache [Migraine Headache] : no migraine headache [Tension Headache] : no tension-type headache [Sleep Disturbances] : no sleep disturbances [Anxiety] : no anxiety [Depression] : no depression

## 2022-04-15 ENCOUNTER — OUTPATIENT (OUTPATIENT)
Dept: OUTPATIENT SERVICES | Facility: HOSPITAL | Age: 77
LOS: 1 days | Discharge: HOME | End: 2022-04-15

## 2022-04-15 ENCOUNTER — APPOINTMENT (OUTPATIENT)
Dept: HEMATOLOGY ONCOLOGY | Facility: CLINIC | Age: 77
End: 2022-04-15
Payer: MEDICARE

## 2022-04-15 VITALS
HEART RATE: 97 BPM | BODY MASS INDEX: 22.86 KG/M2 | WEIGHT: 129 LBS | SYSTOLIC BLOOD PRESSURE: 158 MMHG | TEMPERATURE: 97.8 F | HEIGHT: 63 IN | DIASTOLIC BLOOD PRESSURE: 73 MMHG

## 2022-04-15 DIAGNOSIS — Z87.19 PERSONAL HISTORY OF OTHER DISEASES OF THE DIGESTIVE SYSTEM: Chronic | ICD-10-CM

## 2022-04-15 DIAGNOSIS — Z96.641 PRESENCE OF RIGHT ARTIFICIAL HIP JOINT: Chronic | ICD-10-CM

## 2022-04-15 DIAGNOSIS — Z63.4 DISAPPEARANCE AND DEATH OF FAMILY MEMBER: ICD-10-CM

## 2022-04-15 DIAGNOSIS — N70.92 OOPHORITIS, UNSPECIFIED: Chronic | ICD-10-CM

## 2022-04-15 DIAGNOSIS — D68.51 ACTIVATED PROTEIN C RESISTANCE: ICD-10-CM

## 2022-04-15 DIAGNOSIS — Z87.442 PERSONAL HISTORY OF URINARY CALCULI: Chronic | ICD-10-CM

## 2022-04-15 PROCEDURE — 99204 OFFICE O/P NEW MOD 45 MIN: CPT

## 2022-04-15 RX ORDER — HYDROXYUREA 500 MG/1
500 CAPSULE ORAL TWICE DAILY
Qty: 180 | Refills: 3 | Status: ACTIVE | COMMUNITY
Start: 2022-04-15 | End: 1900-01-01

## 2022-04-15 SDOH — SOCIAL STABILITY - SOCIAL INSECURITY: DISSAPEARANCE AND DEATH OF FAMILY MEMBER: Z63.4

## 2022-04-18 DIAGNOSIS — D68.51 ACTIVATED PROTEIN C RESISTANCE: ICD-10-CM

## 2022-04-26 RX ORDER — RIZATRIPTAN BENZOATE 10 MG/1
10 TABLET, ORALLY DISINTEGRATING ORAL
Qty: 10 | Refills: 5 | Status: ACTIVE | COMMUNITY
Start: 1900-01-01 | End: 1900-01-01

## 2022-06-22 ENCOUNTER — RX RENEWAL (OUTPATIENT)
Age: 77
End: 2022-06-22

## 2022-10-27 ENCOUNTER — APPOINTMENT (OUTPATIENT)
Dept: ORTHOPEDIC SURGERY | Facility: CLINIC | Age: 77
End: 2022-10-27

## 2022-10-27 VITALS — WEIGHT: 130 LBS | BODY MASS INDEX: 24.55 KG/M2 | HEIGHT: 61 IN

## 2022-10-27 DIAGNOSIS — S99.911A UNSPECIFIED INJURY OF RIGHT ANKLE, INITIAL ENCOUNTER: ICD-10-CM

## 2022-10-27 PROCEDURE — L1902: CPT | Mod: RT,KX

## 2022-10-27 PROCEDURE — 73610 X-RAY EXAM OF ANKLE: CPT | Mod: RT

## 2022-10-27 PROCEDURE — 99203 OFFICE O/P NEW LOW 30 MIN: CPT

## 2022-10-27 NOTE — IMAGING
[de-identified] : Examination of the right ankle,  she has mild swelling over the lateral aspect of the ankle, no ecchymosis, no erythema.  \par Good range of motion to dorsiflexion plantar flexion.  \par Tenderness over the anterior talofibular ligament and over the posterior talofibular ligament.  \par Nontender the  calcaneofibular ligament.  \par Nontender over the deltoid ligaments.  \par Nontender medial or lateral malleolus.  \par Nontender the base of the 5th metatarsal.  \par Nontender the Lisfranc.  \par Good motor strength to dorsiflexion and plantar flexion.  \par  mild antalgic gait.\par \par   X-ray of the right ankle was negative for any acute fracture dislocation, this x-ray was done in office today.  Three views were taking.\par

## 2022-10-27 NOTE — HISTORY OF PRESENT ILLNESS
[de-identified] :  77-year-old female here for an evaluation injury sustained to the right ankle on 06/20/2022.  \par Patient states she tripped rolled her ankle, since then she has been having pain and swelling over the right ankle.  \par Patient states that the pain with ambulation to the 9/10 at rest is 6/10.  \par Patient does admits of applying ice to her right ankle with improvement of symptoms.  \par \par Patient has significant history of lower back pain and spinal surgery.  \par Patient states that she requires a cane or a walker for ambulation to her lower back.\par Patient has a medical history of hypertension, thyroid disease, arthritis

## 2022-10-27 NOTE — DISCUSSION/SUMMARY
[de-identified] : \par IMPRESSION:  Right ankle injury possibly lateral ligament sprain\par \par \par PLAN: Patient was advised that this injury may take about 4-6 weeks healing he will be immobilized for about 4 weeks.  \par Patient  was offer  ASO brace  for support and stability.\par Was advised for weight-bearing as tolerated.\par Was also advised to keep the leg elevated, and apply ice to decrease swelling.\par In couple days, warm soaks with Epsom salt were recommended.\par Advised to prevent movement of of the ankle side to side.  Encouraged to do range of motion to dorsiflexion plantar flexion.  Patient was advised to follow up in 2-3 weeks for repeat evaluation.\par \par \par FOLLOW-UP: 2-3 weeks.\par \par \par \par SUPERVISING PHYSICIAN DR. LANDEROS.\par

## 2022-11-02 ENCOUNTER — RESULT REVIEW (OUTPATIENT)
Age: 77
End: 2022-11-02

## 2022-11-02 ENCOUNTER — OUTPATIENT (OUTPATIENT)
Dept: OUTPATIENT SERVICES | Facility: HOSPITAL | Age: 77
LOS: 1 days | Discharge: HOME | End: 2022-11-02

## 2022-11-02 DIAGNOSIS — Z87.19 PERSONAL HISTORY OF OTHER DISEASES OF THE DIGESTIVE SYSTEM: Chronic | ICD-10-CM

## 2022-11-02 DIAGNOSIS — Z96.641 PRESENCE OF RIGHT ARTIFICIAL HIP JOINT: Chronic | ICD-10-CM

## 2022-11-02 DIAGNOSIS — N70.92 OOPHORITIS, UNSPECIFIED: Chronic | ICD-10-CM

## 2022-11-02 DIAGNOSIS — Z12.31 ENCOUNTER FOR SCREENING MAMMOGRAM FOR MALIGNANT NEOPLASM OF BREAST: ICD-10-CM

## 2022-11-02 DIAGNOSIS — Z87.442 PERSONAL HISTORY OF URINARY CALCULI: Chronic | ICD-10-CM

## 2022-11-02 DIAGNOSIS — R92.2 INCONCLUSIVE MAMMOGRAM: ICD-10-CM

## 2022-11-02 PROCEDURE — 77067 SCR MAMMO BI INCL CAD: CPT | Mod: 26

## 2022-11-02 PROCEDURE — 77063 BREAST TOMOSYNTHESIS BI: CPT | Mod: 26

## 2022-11-02 PROCEDURE — 76641 ULTRASOUND BREAST COMPLETE: CPT | Mod: 26,50

## 2022-11-25 ENCOUNTER — APPOINTMENT (OUTPATIENT)
Dept: ORTHOPEDIC SURGERY | Facility: CLINIC | Age: 77
End: 2022-11-25

## 2022-12-13 ENCOUNTER — APPOINTMENT (OUTPATIENT)
Dept: BREAST CENTER | Facility: CLINIC | Age: 77
End: 2022-12-13

## 2022-12-13 VITALS
HEIGHT: 61 IN | SYSTOLIC BLOOD PRESSURE: 141 MMHG | BODY MASS INDEX: 24.55 KG/M2 | WEIGHT: 130 LBS | DIASTOLIC BLOOD PRESSURE: 84 MMHG

## 2022-12-13 DIAGNOSIS — N60.12 DIFFUSE CYSTIC MASTOPATHY OF RIGHT BREAST: ICD-10-CM

## 2022-12-13 DIAGNOSIS — N60.11 DIFFUSE CYSTIC MASTOPATHY OF RIGHT BREAST: ICD-10-CM

## 2022-12-13 PROCEDURE — 99213 OFFICE O/P EST LOW 20 MIN: CPT

## 2022-12-13 NOTE — PHYSICAL EXAM
[Normocephalic] : normocephalic [EOMI] : extra ocular movement intact [No Supraclavicular Adenopathy] : no supraclavicular adenopathy [No Cervical Adenopathy] : no cervical adenopathy [Examined in the supine and seated position] : examined in the supine and seated position [No dominant masses] : no dominant masses in right breast  [No dominant masses] : no dominant masses left breast [No Nipple Retraction] : no left nipple retraction [No Nipple Discharge] : no left nipple discharge [Breast Mass Right Breast ___cm] : no masses [Breast Mass Left Breast ___cm] : no masses [No Axillary Lymphadenopathy] : no left axillary lymphadenopathy [Soft] : abdomen soft [No Rashes] : no rashes [No Ulceration] : no ulceration [Breast Nipple Inversion] : nipples not inverted [Breast Nipple Retraction] : nipples not retracted [Breast Nipple Flattening] : nipples not flattened [Breast Nipple Fissures] : nipples not fissured [de-identified] : NL respirations

## 2022-12-13 NOTE — HISTORY OF PRESENT ILLNESS
[FreeTextEntry1] : Patient is a 77F with history of FCC.\par She has had two prior RIGHT breast biopsies in the past which were reportedly benign. \par \par HISTORICAL RISK FACTORS: \par -no prior breast surgeries \par -no family history of breast or ovarian cancer \par -, age at first live birth was 24\par -no prior OCP use \par -s/p ovarian cystectomy in the past \par \par Her imaging is as follows:\par 10/28/2020: b/l mammogram and US --> BIRADS 1\par unrevealing for any suspicious lesions in either breast\par \par 10/29/2021: b/l mammogram and US --> BIRADS 1\par Dense\par \par 22: B Scg MMG/US --> BIRADS 1\par Dense\par No masses/calcifications\par Recommendation for annual screening\par \par Patient denies any current complaints

## 2022-12-13 NOTE — ASSESSMENT
[FreeTextEntry1] : Patient is a 77F with history of fibrocystic breast changes and breast biopsy x 2 (reportedly benign).  Her most recent imaging is a bilateral mmg/us (11/2/22) which showed no abnormalities (BIRADS 1) with a recommendation for annual screening.  Her PE is unremarkable.  We discussed dense breasts. Increasing breast density has been found to increase ones risk of breast cancer, but at this time, there is no clear indication for additional imaging in this setting, as both US and MRI have not been found to improve survival. One can consider bilateral screening US. However, out of 1000 women screened, the use of routine US will only identify an additional 3-5 cancers. The use of US was found to increase the likelihood of undergoing more imaging and more biopsies. She does have dense breasts. We have decided to proceed with screening bilateral breast US at this time. This will be scheduled with her next screening mammogram.  All questions and concerns were answered in detail.  Patient for B mmg/US in 11/2023.  She is to follow up after imaging, pending any interval changes.  Total time spent on encounter was greater than 20 minutes, which included face to face time with the patient, performing an exam, reviewing previous medical records including imaging/ pathology, documenting in patient record and coordinating care/imaging. Greater than 50% of the encounter was spent on counseling and coordination of her breast issue.

## 2022-12-15 ENCOUNTER — APPOINTMENT (OUTPATIENT)
Dept: NEUROLOGY | Facility: CLINIC | Age: 77
End: 2022-12-15

## 2023-01-05 ENCOUNTER — NON-APPOINTMENT (OUTPATIENT)
Age: 78
End: 2023-01-05

## 2023-01-09 ENCOUNTER — APPOINTMENT (OUTPATIENT)
Dept: GASTROENTEROLOGY | Facility: CLINIC | Age: 78
End: 2023-01-09
Payer: MEDICARE

## 2023-01-09 VITALS
OXYGEN SATURATION: 98 % | DIASTOLIC BLOOD PRESSURE: 86 MMHG | WEIGHT: 131 LBS | HEIGHT: 61 IN | HEART RATE: 78 BPM | SYSTOLIC BLOOD PRESSURE: 174 MMHG | BODY MASS INDEX: 24.73 KG/M2

## 2023-01-09 DIAGNOSIS — Z87.19 PERSONAL HISTORY OF OTHER DISEASES OF THE DIGESTIVE SYSTEM: ICD-10-CM

## 2023-01-09 DIAGNOSIS — Z86.39 PERSONAL HISTORY OF OTHER ENDOCRINE, NUTRITIONAL AND METABOLIC DISEASE: ICD-10-CM

## 2023-01-09 DIAGNOSIS — K22.70 BARRETT'S ESOPHAGUS W/OUT DYSPLASIA: ICD-10-CM

## 2023-01-09 DIAGNOSIS — Z86.69 PERSONAL HISTORY OF OTHER DISEASES OF THE NERVOUS SYSTEM AND SENSE ORGANS: ICD-10-CM

## 2023-01-09 DIAGNOSIS — Z87.39 PERSONAL HISTORY OF OTHER DISEASES OF THE MUSCULOSKELETAL SYSTEM AND CONNECTIVE TISSUE: ICD-10-CM

## 2023-01-09 DIAGNOSIS — G43.909 MIGRAINE, UNSPECIFIED, NOT INTRACTABLE, W/OUT STATUS MIGRAINOSUS: ICD-10-CM

## 2023-01-09 DIAGNOSIS — K21.9 GASTRO-ESOPHAGEAL REFLUX DISEASE W/OUT ESOPHAGITIS: ICD-10-CM

## 2023-01-09 DIAGNOSIS — K63.5 POLYP OF COLON: ICD-10-CM

## 2023-01-09 DIAGNOSIS — M19.90 UNSPECIFIED OSTEOARTHRITIS, UNSPECIFIED SITE: ICD-10-CM

## 2023-01-09 DIAGNOSIS — K31.7 POLYP OF STOMACH AND DUODENUM: ICD-10-CM

## 2023-01-09 DIAGNOSIS — R12 HEARTBURN: ICD-10-CM

## 2023-01-09 DIAGNOSIS — K57.90 DIVERTICULOSIS OF INTESTINE, PART UNSPECIFIED, W/OUT PERFORATION OR ABSCESS W/OUT BLEEDING: ICD-10-CM

## 2023-01-09 DIAGNOSIS — Z80.1 FAMILY HISTORY OF MALIGNANT NEOPLASM OF TRACHEA, BRONCHUS AND LUNG: ICD-10-CM

## 2023-01-09 DIAGNOSIS — Z80.0 FAMILY HISTORY OF MALIGNANT NEOPLASM OF DIGESTIVE ORGANS: ICD-10-CM

## 2023-01-09 DIAGNOSIS — K44.9 DIAPHRAGMATIC HERNIA W/OUT OBSTRUCTION OR GANGRENE: ICD-10-CM

## 2023-01-09 PROCEDURE — 99204 OFFICE O/P NEW MOD 45 MIN: CPT

## 2023-01-09 RX ORDER — OMEPRAZOLE 20 MG/1
20 TABLET, DELAYED RELEASE ORAL DAILY
Refills: 0 | Status: ACTIVE | COMMUNITY
Start: 2023-01-09

## 2023-01-09 RX ORDER — OMEPRAZOLE 20 MG/1
20 CAPSULE, DELAYED RELEASE ORAL TWICE DAILY
Qty: 60 | Refills: 5 | Status: DISCONTINUED | COMMUNITY
Start: 2023-01-09 | End: 2023-01-09

## 2023-01-09 RX ORDER — OMEPRAZOLE 20 MG/1
20 CAPSULE, DELAYED RELEASE ORAL TWICE DAILY
Qty: 60 | Refills: 5 | Status: ACTIVE | COMMUNITY
Start: 2023-01-09 | End: 1900-01-01

## 2023-01-12 ENCOUNTER — APPOINTMENT (OUTPATIENT)
Dept: GASTROENTEROLOGY | Facility: CLINIC | Age: 78
End: 2023-01-12
Payer: MEDICARE

## 2023-01-12 DIAGNOSIS — K76.0 FATTY (CHANGE OF) LIVER, NOT ELSEWHERE CLASSIFIED: ICD-10-CM

## 2023-01-12 PROCEDURE — 91200 LIVER ELASTOGRAPHY: CPT

## 2023-01-13 ENCOUNTER — APPOINTMENT (OUTPATIENT)
Dept: ORTHOPEDIC SURGERY | Facility: CLINIC | Age: 78
End: 2023-01-13

## 2023-01-13 PROBLEM — K76.0 FATTY LIVER: Status: ACTIVE | Noted: 2023-01-09

## 2023-01-13 NOTE — ASSESSMENT
[FreeTextEntry1] : 77 yr old female with HTN, HLD, RA, here for F/U for GERD, Hiatal Hernia,Esophagitis. \par \par Chronic GERD with persistent burning at hs, refractory to treatment with Omeprazole and Famotidine\par \par - EGD results from 12/28/2021 reviewed\par - will schedule an EGD; all risks and benefits of procedure explained to patient today\par - GERD diet and GERD measures discussed with patient today\par - Patient told to take Omeprazole 1/2 hr before lunch and dinner q day\par - She reported an adverse reaction to the Propofol in the past but had it since then without any adverse reactions; we will let the Anesthesiologist aware of this on the day of the procedure\par \par H/O Colon polyp without dysplasia\par \par - Colonoscopy from 11/2019 reviewed; no polyps were found and it was recommended to repeat in 5 years ( 2024)\par \par H/O Hepatic Steatosis\par \par - US of Abdomen from 4/2021 and labs from last year reviewed \par - CMP, hepatic panel and Fibroscan to be done\par - F/U in the office after the above test completed\par \par .\par

## 2023-01-13 NOTE — PHYSICAL EXAM
[Normal Voice/Communication] : normal voice/communication [Sclera] : the sclera and conjunctiva were normal [Normal] : heart rate was normal and rhythm regular, normal S1 and S2, no murmurs [None] : no edema [Bowel Sounds] : normal bowel sounds [No Masses] : no abdominal mass palpated [Abdomen Soft] : soft [] : no hepatosplenomegaly [Abnormal Walk] : normal gait [Normal Color / Pigmentation] : normal skin color and pigmentation [Oriented To Time, Place, And Person] : oriented to person, place, and time [Ascites: ___] : no ascites [Rebound Tenderness] : no rebound tenderness [de-identified] : Using walker  [de-identified] : generalized mild tenderness

## 2023-01-13 NOTE — REVIEW OF SYSTEMS
[As Noted in HPI] : as noted in HPI [Constipation] : constipation [Heartburn] : heartburn [Negative] : Heme/Lymph [Abdominal Pain] : no abdominal pain [Vomiting] : no vomiting [Diarrhea] : no diarrhea [Melena (black stool)] : no melena [Bleeding] : no bleeding [Fecal Incontinence (soiling)] : no fecal incontinence [Bloating (gassiness)] : no bloating

## 2023-02-02 ENCOUNTER — APPOINTMENT (OUTPATIENT)
Dept: NEUROLOGY | Facility: CLINIC | Age: 78
End: 2023-02-02

## 2023-03-24 ENCOUNTER — APPOINTMENT (OUTPATIENT)
Dept: PULMONOLOGY | Facility: CLINIC | Age: 78
End: 2023-03-24
Payer: MEDICARE

## 2023-03-24 VITALS
HEIGHT: 60 IN | DIASTOLIC BLOOD PRESSURE: 80 MMHG | RESPIRATION RATE: 14 BRPM | HEART RATE: 95 BPM | SYSTOLIC BLOOD PRESSURE: 120 MMHG | BODY MASS INDEX: 26.11 KG/M2 | OXYGEN SATURATION: 96 % | WEIGHT: 133 LBS

## 2023-03-24 DIAGNOSIS — D47.3 ESSENTIAL (HEMORRHAGIC) THROMBOCYTHEMIA: ICD-10-CM

## 2023-03-24 PROCEDURE — 99213 OFFICE O/P EST LOW 20 MIN: CPT

## 2023-03-24 NOTE — PHYSICAL EXAM
[No Acute Distress] : no acute distress [III] : Mallampati Class: III [Normal Appearance] : normal appearance [No Neck Mass] : no neck mass [Normal Rate/Rhythm] : normal rate/rhythm [Normal S1, S2] : normal s1, s2 [No Murmurs] : no murmurs [No Resp Distress] : no resp distress [Clear to Auscultation Bilaterally] : clear to auscultation bilaterally [No Abnormalities] : no abnormalities [Benign] : benign [Normal Gait] : normal gait [No Clubbing] : no clubbing [No Cyanosis] : no cyanosis [No Edema] : no edema [FROM] : FROM [Normal Color/ Pigmentation] : normal color/ pigmentation [No Focal Deficits] : no focal deficits [Oriented x3] : oriented x3 [Normal Affect] : normal affect

## 2023-04-10 NOTE — PACU DISCHARGE NOTE - PAIN:
Left E portal message Friday evening c/o chest pains for a couple days. His BP has been good.  Reports he did go to [facility] for evaluation and work up was negative.   Controlled with current regime

## 2023-04-14 ENCOUNTER — APPOINTMENT (OUTPATIENT)
Dept: ORTHOPEDIC SURGERY | Facility: CLINIC | Age: 78
End: 2023-04-14
Payer: MEDICARE

## 2023-04-14 DIAGNOSIS — M25.531 PAIN IN RIGHT WRIST: ICD-10-CM

## 2023-04-14 PROCEDURE — 99212 OFFICE O/P EST SF 10 MIN: CPT | Mod: 25

## 2023-04-14 PROCEDURE — 73110 X-RAY EXAM OF WRIST: CPT | Mod: RT

## 2023-04-14 PROCEDURE — 20605 DRAIN/INJ JOINT/BURSA W/O US: CPT | Mod: RT

## 2023-04-14 NOTE — PROCEDURE
[FreeTextEntry3] : Wrist injection was performed because of pain inflammation and stiffness\par Anesthesia: ethyl chloride sprayed topically\par Dexamethasone: An injection of Dexamethasone 1cc  4mg/ml\par Lidocaine: An injection of Lidocaine 1% 1cc\par \par Patient has tried OTC's including aspirin, Ibuprofen, Aleve etc or prescription NSAIDS, and/or exercises at home and/ or\par physical therapy without satisfactory response.\par After verbal consent using sterile preparation and technique. The risks, benefits, and alternatives to cortisone injection\par were explained in full to the patient. Risks outlined include but are not limited to infection, sepsis, bleeding, scarring, skin\par discoloration, temporary increase in pain, syncopal episode, failure to resolve symptoms, allergic reaction, symptom\par recurrence, and elevation of blood sugar in diabetics. Patient understood the risks. All questions were answered. After\par discussion of options, patient requested an injection. Oral informed consent was obtained and sterile prep was done of the\par injection site. Sterile technique was utilized for the procedure including the preparation of the solutions used for the\par injection. Patient tolerated the procedure well. Advised to ice the injection site this evening.\par Prep with ETOH locally to site. Sterile technique used\par Right radiocarpal joint

## 2023-04-14 NOTE — DATA REVIEWED
[FreeTextEntry1] : Radiographs 3 views of the right wrist are reviewed documenting a deformity as well as DRUJ and radiocarpal arthritis as well as flaps along the basal joint

## 2023-04-14 NOTE — ASSESSMENT
[FreeTextEntry1] : Patient has osteoarthritis of the right wrist we discussed medications and the natural history of this condition she will also get a cortisone injection into the right wrist which she tolerated well.

## 2023-04-14 NOTE — HISTORY OF PRESENT ILLNESS
[de-identified] : 78-year-old female with right-sided wrist pain discomfort.  Comes in today for evaluation.  She did have an MRI done.  She gets treated by rheumatologist.  When she takes steroids she feels better.  When she gets weaned off the steroid she has more pain.  She is on chronic medication from the rheumatologist.

## 2023-04-14 NOTE — PHYSICAL EXAM
[de-identified] : Patient is pain along the basal joint on the right thumb.  Patient also has pain discomfort with flexion extension of the wrist.  She has deformity consistent with arthritis.  She has no erythema ecchymoses or abrasions visual deformity from a wrist fracture.

## 2023-04-20 ENCOUNTER — APPOINTMENT (OUTPATIENT)
Dept: SLEEP CENTER | Facility: HOSPITAL | Age: 78
End: 2023-04-20
Payer: MEDICARE

## 2023-04-20 ENCOUNTER — OUTPATIENT (OUTPATIENT)
Dept: OUTPATIENT SERVICES | Facility: HOSPITAL | Age: 78
LOS: 1 days | Discharge: HOME | End: 2023-04-20
Payer: MEDICARE

## 2023-04-20 DIAGNOSIS — Z87.19 PERSONAL HISTORY OF OTHER DISEASES OF THE DIGESTIVE SYSTEM: Chronic | ICD-10-CM

## 2023-04-20 DIAGNOSIS — Z96.641 PRESENCE OF RIGHT ARTIFICIAL HIP JOINT: Chronic | ICD-10-CM

## 2023-04-20 DIAGNOSIS — N70.92 OOPHORITIS, UNSPECIFIED: Chronic | ICD-10-CM

## 2023-04-20 DIAGNOSIS — G47.33 OBSTRUCTIVE SLEEP APNEA (ADULT) (PEDIATRIC): ICD-10-CM

## 2023-04-20 DIAGNOSIS — Z87.442 PERSONAL HISTORY OF URINARY CALCULI: Chronic | ICD-10-CM

## 2023-04-20 PROCEDURE — 95810 POLYSOM 6/> YRS 4/> PARAM: CPT

## 2023-04-20 PROCEDURE — 95810 POLYSOM 6/> YRS 4/> PARAM: CPT | Mod: 26

## 2023-04-22 DIAGNOSIS — G47.33 OBSTRUCTIVE SLEEP APNEA (ADULT) (PEDIATRIC): ICD-10-CM

## 2023-05-22 ENCOUNTER — APPOINTMENT (OUTPATIENT)
Dept: ORTHOPEDIC SURGERY | Facility: CLINIC | Age: 78
End: 2023-05-22
Payer: MEDICARE

## 2023-05-22 VITALS — WEIGHT: 133 LBS | HEIGHT: 60 IN | BODY MASS INDEX: 26.11 KG/M2

## 2023-05-22 DIAGNOSIS — M19.031 PRIMARY OSTEOARTHRITIS, RIGHT WRIST: ICD-10-CM

## 2023-05-22 PROCEDURE — 99213 OFFICE O/P EST LOW 20 MIN: CPT

## 2023-05-22 NOTE — ASSESSMENT
[FreeTextEntry1] : Patient is right-sided wrist pain discomfort may consider another cortisone injection in the coming months.  Hold off for 6 months between injections.  She is going to try anti-inflammatories she will use a wrist brace as tolerated.  See me back as needed.

## 2023-05-22 NOTE — PHYSICAL EXAM
[de-identified] : Patient is some tenderness to palpation around the STT joint and the carpal joint she has good range of motion of the fingers there is no erythema ecchymoses or abrasions.

## 2023-05-22 NOTE — HISTORY OF PRESENT ILLNESS
[de-identified] : 78-year-old female with wrist pain discomfort she actually feels better than she did before.  Injection did help her to some degree.  But she still has some pain discomfort in the wrist comes in today.

## 2023-06-01 ENCOUNTER — APPOINTMENT (OUTPATIENT)
Dept: PULMONOLOGY | Facility: CLINIC | Age: 78
End: 2023-06-01
Payer: MEDICARE

## 2023-06-01 VITALS
OXYGEN SATURATION: 98 % | HEART RATE: 89 BPM | SYSTOLIC BLOOD PRESSURE: 120 MMHG | DIASTOLIC BLOOD PRESSURE: 80 MMHG | RESPIRATION RATE: 14 BRPM | BODY MASS INDEX: 26.9 KG/M2 | WEIGHT: 137 LBS | HEIGHT: 60 IN

## 2023-06-01 DIAGNOSIS — R06.02 SHORTNESS OF BREATH: ICD-10-CM

## 2023-06-01 DIAGNOSIS — R53.1 WEAKNESS: ICD-10-CM

## 2023-06-01 PROCEDURE — 99213 OFFICE O/P EST LOW 20 MIN: CPT

## 2023-06-02 NOTE — DISCUSSION/SUMMARY
[FreeTextEntry1] : JOSÉ CPAP TITRATION\par SOB ON EXERTION STABLE\par PULMONARY STANDPOINT NO CONTRAINDICATION TO PLANNED PROCEDURE\par

## 2023-06-19 ENCOUNTER — OUTPATIENT (OUTPATIENT)
Dept: OUTPATIENT SERVICES | Facility: HOSPITAL | Age: 78
LOS: 1 days | End: 2023-06-19
Payer: MEDICARE

## 2023-06-19 VITALS
HEIGHT: 61 IN | SYSTOLIC BLOOD PRESSURE: 150 MMHG | TEMPERATURE: 96 F | OXYGEN SATURATION: 99 % | DIASTOLIC BLOOD PRESSURE: 84 MMHG | HEART RATE: 75 BPM | RESPIRATION RATE: 18 BRPM | WEIGHT: 136.03 LBS

## 2023-06-19 DIAGNOSIS — Z87.19 PERSONAL HISTORY OF OTHER DISEASES OF THE DIGESTIVE SYSTEM: Chronic | ICD-10-CM

## 2023-06-19 DIAGNOSIS — Z01.818 ENCOUNTER FOR OTHER PREPROCEDURAL EXAMINATION: ICD-10-CM

## 2023-06-19 DIAGNOSIS — N70.92 OOPHORITIS, UNSPECIFIED: Chronic | ICD-10-CM

## 2023-06-19 DIAGNOSIS — Z87.442 PERSONAL HISTORY OF URINARY CALCULI: Chronic | ICD-10-CM

## 2023-06-19 DIAGNOSIS — Z98.49 CATARACT EXTRACTION STATUS, UNSPECIFIED EYE: Chronic | ICD-10-CM

## 2023-06-19 DIAGNOSIS — Z96.641 PRESENCE OF RIGHT ARTIFICIAL HIP JOINT: Chronic | ICD-10-CM

## 2023-06-19 DIAGNOSIS — N95.0 POSTMENOPAUSAL BLEEDING: ICD-10-CM

## 2023-06-19 DIAGNOSIS — Z98.890 OTHER SPECIFIED POSTPROCEDURAL STATES: Chronic | ICD-10-CM

## 2023-06-19 LAB
ALBUMIN SERPL ELPH-MCNC: 4.8 G/DL — SIGNIFICANT CHANGE UP (ref 3.5–5.2)
ALP SERPL-CCNC: 46 U/L — SIGNIFICANT CHANGE UP (ref 30–115)
ALT FLD-CCNC: 26 U/L — SIGNIFICANT CHANGE UP (ref 0–41)
ANION GAP SERPL CALC-SCNC: 11 MMOL/L — SIGNIFICANT CHANGE UP (ref 7–14)
APTT BLD: 27.4 SEC — SIGNIFICANT CHANGE UP (ref 27–39.2)
AST SERPL-CCNC: 24 U/L — SIGNIFICANT CHANGE UP (ref 0–41)
BASOPHILS # BLD AUTO: 0.03 K/UL — SIGNIFICANT CHANGE UP (ref 0–0.2)
BASOPHILS NFR BLD AUTO: 0.4 % — SIGNIFICANT CHANGE UP (ref 0–1)
BILIRUB SERPL-MCNC: 0.3 MG/DL — SIGNIFICANT CHANGE UP (ref 0.2–1.2)
BUN SERPL-MCNC: 19 MG/DL — SIGNIFICANT CHANGE UP (ref 10–20)
CALCIUM SERPL-MCNC: 10 MG/DL — SIGNIFICANT CHANGE UP (ref 8.4–10.5)
CHLORIDE SERPL-SCNC: 98 MMOL/L — SIGNIFICANT CHANGE UP (ref 98–110)
CO2 SERPL-SCNC: 29 MMOL/L — SIGNIFICANT CHANGE UP (ref 17–32)
CREAT SERPL-MCNC: 0.8 MG/DL — SIGNIFICANT CHANGE UP (ref 0.7–1.5)
EGFR: 74 ML/MIN/1.73M2 — SIGNIFICANT CHANGE UP
EOSINOPHIL # BLD AUTO: 0.07 K/UL — SIGNIFICANT CHANGE UP (ref 0–0.7)
EOSINOPHIL NFR BLD AUTO: 0.9 % — SIGNIFICANT CHANGE UP (ref 0–8)
GLUCOSE SERPL-MCNC: 106 MG/DL — HIGH (ref 70–99)
HCT VFR BLD CALC: 36.1 % — LOW (ref 37–47)
HGB BLD-MCNC: 12.5 G/DL — SIGNIFICANT CHANGE UP (ref 12–16)
IMM GRANULOCYTES NFR BLD AUTO: 0.4 % — HIGH (ref 0.1–0.3)
INR BLD: 0.89 RATIO — SIGNIFICANT CHANGE UP (ref 0.65–1.3)
LYMPHOCYTES # BLD AUTO: 1.84 K/UL — SIGNIFICANT CHANGE UP (ref 1.2–3.4)
LYMPHOCYTES # BLD AUTO: 24 % — SIGNIFICANT CHANGE UP (ref 20.5–51.1)
MCHC RBC-ENTMCNC: 32.2 PG — HIGH (ref 27–31)
MCHC RBC-ENTMCNC: 34.6 G/DL — SIGNIFICANT CHANGE UP (ref 32–37)
MCV RBC AUTO: 93 FL — SIGNIFICANT CHANGE UP (ref 81–99)
MONOCYTES # BLD AUTO: 0.75 K/UL — HIGH (ref 0.1–0.6)
MONOCYTES NFR BLD AUTO: 9.8 % — HIGH (ref 1.7–9.3)
NEUTROPHILS # BLD AUTO: 4.94 K/UL — SIGNIFICANT CHANGE UP (ref 1.4–6.5)
NEUTROPHILS NFR BLD AUTO: 64.5 % — SIGNIFICANT CHANGE UP (ref 42.2–75.2)
NRBC # BLD: 0 /100 WBCS — SIGNIFICANT CHANGE UP (ref 0–0)
PLATELET # BLD AUTO: 350 K/UL — SIGNIFICANT CHANGE UP (ref 130–400)
PMV BLD: 9.6 FL — SIGNIFICANT CHANGE UP (ref 7.4–10.4)
POTASSIUM SERPL-MCNC: 4.1 MMOL/L — SIGNIFICANT CHANGE UP (ref 3.5–5)
POTASSIUM SERPL-SCNC: 4.1 MMOL/L — SIGNIFICANT CHANGE UP (ref 3.5–5)
PROT SERPL-MCNC: 7.2 G/DL — SIGNIFICANT CHANGE UP (ref 6–8)
PROTHROM AB SERPL-ACNC: 10.1 SEC — SIGNIFICANT CHANGE UP (ref 9.95–12.87)
RBC # BLD: 3.88 M/UL — LOW (ref 4.2–5.4)
RBC # FLD: 12.5 % — SIGNIFICANT CHANGE UP (ref 11.5–14.5)
SODIUM SERPL-SCNC: 138 MMOL/L — SIGNIFICANT CHANGE UP (ref 135–146)
WBC # BLD: 7.66 K/UL — SIGNIFICANT CHANGE UP (ref 4.8–10.8)
WBC # FLD AUTO: 7.66 K/UL — SIGNIFICANT CHANGE UP (ref 4.8–10.8)

## 2023-06-19 PROCEDURE — 71046 X-RAY EXAM CHEST 2 VIEWS: CPT

## 2023-06-19 PROCEDURE — 85025 COMPLETE CBC W/AUTO DIFF WBC: CPT

## 2023-06-19 PROCEDURE — 36415 COLL VENOUS BLD VENIPUNCTURE: CPT

## 2023-06-19 PROCEDURE — 99214 OFFICE O/P EST MOD 30 MIN: CPT | Mod: 25

## 2023-06-19 PROCEDURE — 71046 X-RAY EXAM CHEST 2 VIEWS: CPT | Mod: 26

## 2023-06-19 PROCEDURE — 93010 ELECTROCARDIOGRAM REPORT: CPT

## 2023-06-19 PROCEDURE — 85610 PROTHROMBIN TIME: CPT

## 2023-06-19 PROCEDURE — 80053 COMPREHEN METABOLIC PANEL: CPT

## 2023-06-19 PROCEDURE — 85730 THROMBOPLASTIN TIME PARTIAL: CPT

## 2023-06-19 PROCEDURE — 93005 ELECTROCARDIOGRAM TRACING: CPT

## 2023-06-19 RX ORDER — DOCUSATE SODIUM 100 MG
1 CAPSULE ORAL
Qty: 0 | Refills: 0 | DISCHARGE

## 2023-06-19 RX ORDER — OMEPRAZOLE 10 MG/1
1 CAPSULE, DELAYED RELEASE ORAL
Qty: 0 | Refills: 0 | DISCHARGE

## 2023-06-19 RX ORDER — PROPRANOLOL HCL 160 MG
1 CAPSULE, EXTENDED RELEASE 24HR ORAL
Qty: 0 | Refills: 0 | DISCHARGE

## 2023-06-19 NOTE — H&P PST ADULT - HISTORY OF PRESENT ILLNESS
pt with post menopausal bleeding   dx with fibroid   now for planned procedure       PATIENT CURRENTLY DENIES CHEST PAIN  SHORTNESS OF BREATH  PALPITATIONS,  DYSURIA, OR UPPER RESPIRATORY INFECTION IN PAST 2 WEEKS  EXERCISE  TOLERANCE  1- FLIGHT OF STAIRS  WITHOUT SHORTNESS OF BREATH  denies travel outside the [place] in the past 30 days  Patient denies any signs or symptoms of COVID 19 and denies contact with known positive individuals.  They have an appointment for repeat COVID testing pre-procedure and acknowledge its time and place.  They were instructed to quarantine pre-procedure, practice exposure control measures, continue to self-monitor and report any concerns to their proceduralist.  pt advised self quarantine till day of procedure  Anesthesia Alert  NO--Difficult Airway  NO--History of neck surgery or radiation  NO--Limited ROM of neck  NO--History of Malignant hyperthermia  NO--No personal or family history of Pseudocholinesterase deficiency.  Prior Anesthesia Complication post op n/v   NO--Latex Allergy  NO--Loose teeth  +History of Rheumatoid Arthritis  NO--Bleeding risk  +JOSÉ  + daily  prednisone   mitchel mosqueda     PT DENIES ANY RASHES, ABRASION, OR OPEN WOUNDS OR CUTS    AS PER THE PT, THIS IS HIS/HER COMPLETE MEDICAL AND SURGICAL HX, INCLUDING MEDICATIONS PRESCRIBED AND OVER THE COUNTER    Patient verbalized understanding of instructions and was given the opportunity to ask questions and have them answered.    pt denies any suicidal ideation or thoughts, pt states not a threat to self or others    Postmenopausal bleeding    Encounter for other preprocedural examination    Mild HTN    Abnormal cholesterol test    Acquired cataract    MS (multiple sclerosis)    Generalized OA    Acute gastritis    Chronic GERD    Acute depression    Atypical migraine    H/O vertigo    H/O cholecystitis    History of total right hip replacement    Abscess, ovarian    History of kidney stones    [id number]; [id number]    [id number]

## 2023-06-19 NOTE — H&P PST ADULT - NSICDXPASTSURGICALHX_GEN_ALL_CORE_FT
PAST SURGICAL HISTORY:  Abscess, ovarian     H/O cholecystitis h/o lap neil    History of back surgery     History of kidney stones     History of total right hip replacement     S/P cataract surgery

## 2023-06-19 NOTE — H&P PST ADULT - NSICDXPASTMEDICALHX_GEN_ALL_CORE_FT
PAST MEDICAL HISTORY:  Abnormal cholesterol test     Acquired cataract     Acute depression     Acute gastritis     Atypical migraine     Chronic GERD     Factor V Leiden     Generalized OA     H/O vertigo     Mild HTN     MS (multiple sclerosis)     Rheumatoid arthritis

## 2023-06-19 NOTE — H&P PST ADULT - REASON FOR ADMISSION
Patient is a  78 year old  female presenting to PAST in preparation for HYSTEROSCOPY DILATION AND CURRETTAGE, POSSIBLE HYSTEROSCOPIC MYOMECTOMY, POLYPECTOMY AND ALL RELATED PROCEDURES, THICKENED ENDOMETRIAL STRIPE, INCREASE IN SIZE OF FIBROID  on 7/14/23   under general anesthesia by Dr. ordonez

## 2023-06-19 NOTE — H&P PST ADULT - ATTENDING COMMENTS
Patient-  FT fem= presents for thickened EMS for hysteroscopy D/C unable to tolerate EMB in office   Obhx-     FT female 7.14lbs ( had ECV)     FT female 6.15lbs induced at 37 wks (PTL) Jaundice of    13/regular/ 3-4 days normal flow   menopause at 52   recurrent UTI ( leona with intercourse)   + fibroids, + history of ovarian cysts, normal pap history of HSV remote outbreak   PMH- MS   Vertigo   as per patient early Pulmonary HTN   PSH- Fractured pelvis- 2017 cystectomt   early 20s VARGAS surgery   removal of gallbladder   tear duct surgery   cataract surgery   surgery of kidney stone ~20 years ago    hip surgery  fall and fracture of same hip no surgery   ~15 years ago breast cyst aspiration   2022 Microdiscectomy   Allergies- Sulfer- anaphylaxis   PCN rectal bleeding?  Meds- see Med list   FH- father lung cancer   brother mesothelioma   brother CLL   paternal autnt uterine cancer   SH- denies smoking, ETOH drugs   TVUS- uterine fibroids- posterior body IM. caclcified 0.8x0.7x0.5  EMS 0.4cm   endocervical cysts- (largest 0.5cm) another simple cervical cyst 1.9x2.0x1.1cm   bilateral ovaries not seen   78  LMP age 52 presents for hysteroscopy dilation and curettage for PMB, uterine fibroids,   1- NPO IVF   2- RBA   3- Consent   4- OR Patient-  FT fem= presents for thickened EMS for hysteroscopy D/C unable to tolerate EMB in office   Obhx-     FT female 7.14lbs ( had ECV)     FT female 6.15lbs induced at 37 wks (PTL) Jaundice of    13/regular/ 3-4 days normal flow   menopause at 52   recurrent UTI ( leona with intercourse)   + fibroids, + history of ovarian cysts, normal pap history of HSV remote outbreak   PMH-   MS   Vertigo   as per patient early Pulmonary HTN   HTN   high cholesterol   Gerd, stomach ulcers   hiatal hernia   factor 5 ( lovenox for 5 days)   Migraines   Hypothyroid- Thyroid nodules   Inflammatory Arthritis   OA herniated disc (s/p surgery 2022)   Fatty liver   Increased intraoccular pressure   PreDM   Slight Anemia   mild liver fibrosis   PSH- Fractured pelvis- 2017 cystectomt   early 20s VARGAS surgery   removal of gallbladder   tear duct surgery   cataract surgery   surgery of kidney stone ~20 years ago    hip surgery  fall and fracture of same hip no surgery   ~15 years ago breast cyst aspiration   2022 Microdiscectomy   Allergies- Sulfer- anaphylaxis   PCN rectal bleeding?  Meds- see Med list   FH- father lung cancer   brother mesothelioma   brother CLL   paternal autnt uterine cancer   SH- denies smoking, ETOH drugs   TVUS- uterine fibroids- posterior body IM. caclcified 0.8x0.7x0.5  EMS 0.4cm   endocervical cysts- (largest 0.5cm) another simple cervical cyst 1.9x2.0x1.1cm   bilateral ovaries not seen   78  LMP age 52 presents for hysteroscopy dilation and curettage for PMB, uterine fibroids,   1- OR

## 2023-06-20 DIAGNOSIS — Z01.818 ENCOUNTER FOR OTHER PREPROCEDURAL EXAMINATION: ICD-10-CM

## 2023-06-20 DIAGNOSIS — N95.0 POSTMENOPAUSAL BLEEDING: ICD-10-CM

## 2023-06-26 PROBLEM — D68.51 ACTIVATED PROTEIN C RESISTANCE: Chronic | Status: ACTIVE | Noted: 2023-06-19

## 2023-06-26 PROBLEM — M06.9 RHEUMATOID ARTHRITIS, UNSPECIFIED: Chronic | Status: ACTIVE | Noted: 2023-06-19

## 2023-06-27 ENCOUNTER — OUTPATIENT (OUTPATIENT)
Dept: OUTPATIENT SERVICES | Facility: HOSPITAL | Age: 78
LOS: 1 days | Discharge: HOME | End: 2023-06-27
Payer: MEDICARE

## 2023-06-27 ENCOUNTER — APPOINTMENT (OUTPATIENT)
Dept: SLEEP CENTER | Facility: HOSPITAL | Age: 78
End: 2023-06-27
Payer: MEDICARE

## 2023-06-27 DIAGNOSIS — Z87.19 PERSONAL HISTORY OF OTHER DISEASES OF THE DIGESTIVE SYSTEM: Chronic | ICD-10-CM

## 2023-06-27 DIAGNOSIS — Z87.442 PERSONAL HISTORY OF URINARY CALCULI: Chronic | ICD-10-CM

## 2023-06-27 DIAGNOSIS — Z96.641 PRESENCE OF RIGHT ARTIFICIAL HIP JOINT: Chronic | ICD-10-CM

## 2023-06-27 DIAGNOSIS — Z98.49 CATARACT EXTRACTION STATUS, UNSPECIFIED EYE: Chronic | ICD-10-CM

## 2023-06-27 DIAGNOSIS — Z98.890 OTHER SPECIFIED POSTPROCEDURAL STATES: Chronic | ICD-10-CM

## 2023-06-27 DIAGNOSIS — N70.92 OOPHORITIS, UNSPECIFIED: Chronic | ICD-10-CM

## 2023-06-27 DIAGNOSIS — G47.33 OBSTRUCTIVE SLEEP APNEA (ADULT) (PEDIATRIC): ICD-10-CM

## 2023-06-27 PROCEDURE — 95811 POLYSOM 6/>YRS CPAP 4/> PARM: CPT | Mod: 26

## 2023-06-27 PROCEDURE — 95811 POLYSOM 6/>YRS CPAP 4/> PARM: CPT

## 2023-06-27 NOTE — HISTORY OF PRESENT ILLNESS
[de-identified] : The patient is  a 77 year old female with known history of Factor V Leiden heterozygous mutation coming to find out if any particular recommendation is necessary prior to undergoing spinal surgery (herniated disc). The patient has been in extreme pain because of that. \par The patient is denying any past medical history of any kind of thromboembolic, cardiac or neurologic problems (including TIA-like events, chest pain).\par After a recent weight loss, she has regained all the lost weight.\par No fever or night sweats. \par The patient had multiple surgeries in the past. Still she has not had any thrombotic complications.\par She was still on baby aspirin until a month ago when she stopped it because of her epigastric pains but it didn't make any difference.

## 2023-06-27 NOTE — REVIEW OF SYSTEMS
[Fatigue] : fatigue [Vision Problems] : vision problems [Loss of Hearing] : loss of hearing [SOB on Exertion] : shortness of breath during exertion [Constipation] : constipation [Joint Pain] : joint pain [Joint Stiffness] : joint stiffness [Dizziness] : dizziness [Difficulty Walking] : difficulty walking [Insomnia] : insomnia [Negative] : Heme/Lymph [FreeTextEntry4] : Has a hearing aid [de-identified] : Because of the disc [de-identified] : Takes Flexeril before going to bed

## 2023-06-27 NOTE — REASON FOR VISIT
[Initial Consultation] : an initial consultation for [FreeTextEntry2] : Heterozygous Factor V Leiden

## 2023-06-27 NOTE — PHYSICAL EXAM
[Restricted in physically strenuous activity but ambulatory and able to carry out work of a light or sedentary nature] : Status 1- Restricted in physically strenuous activity but ambulatory and able to carry out work of a light or sedentary nature, e.g., light house work, office work [Normal] : affect appropriate [de-identified] : Arthritic changes noted [de-identified] : Using a walker for ambulation

## 2023-06-27 NOTE — ASSESSMENT
[FreeTextEntry1] : Patient with Factor V Leiden mutation, with no thromboembolic antecedents/complications, now 77 years old, S/P multiple surgeries and at least sedations for endoscopic evaluations. \par I don't see any contraindication for her to undergo spine surgery which will be mostly decompression of a disc space. The patient will just need the usual precautions that are recommended with this kind of surgery. Post surgical early mobilization will probably recommended if the surgery goes without any complication. Prophylactic anticoagulation as per surgical guidelines.\par The patient may resume her 81 mg aspirin following the surgery.\par \par All questions answered.

## 2023-06-28 ENCOUNTER — APPOINTMENT (OUTPATIENT)
Dept: HEMATOLOGY ONCOLOGY | Facility: CLINIC | Age: 78
End: 2023-06-28
Payer: MEDICARE

## 2023-06-28 ENCOUNTER — OUTPATIENT (OUTPATIENT)
Dept: OUTPATIENT SERVICES | Facility: HOSPITAL | Age: 78
LOS: 1 days | End: 2023-06-28
Payer: MEDICARE

## 2023-06-28 VITALS
SYSTOLIC BLOOD PRESSURE: 165 MMHG | BODY MASS INDEX: 26.7 KG/M2 | HEART RATE: 90 BPM | DIASTOLIC BLOOD PRESSURE: 80 MMHG | WEIGHT: 136 LBS | HEIGHT: 60 IN | TEMPERATURE: 98.3 F | RESPIRATION RATE: 14 BRPM

## 2023-06-28 DIAGNOSIS — Z98.890 OTHER SPECIFIED POSTPROCEDURAL STATES: Chronic | ICD-10-CM

## 2023-06-28 DIAGNOSIS — N70.92 OOPHORITIS, UNSPECIFIED: Chronic | ICD-10-CM

## 2023-06-28 DIAGNOSIS — Z87.442 PERSONAL HISTORY OF URINARY CALCULI: Chronic | ICD-10-CM

## 2023-06-28 DIAGNOSIS — H40.059 OCULAR HYPERTENSION, UNSPECIFIED EYE: ICD-10-CM

## 2023-06-28 DIAGNOSIS — Z96.641 PRESENCE OF RIGHT ARTIFICIAL HIP JOINT: Chronic | ICD-10-CM

## 2023-06-28 DIAGNOSIS — Z98.49 CATARACT EXTRACTION STATUS, UNSPECIFIED EYE: Chronic | ICD-10-CM

## 2023-06-28 DIAGNOSIS — D47.3 ESSENTIAL (HEMORRHAGIC) THROMBOCYTHEMIA: ICD-10-CM

## 2023-06-28 DIAGNOSIS — Z87.19 PERSONAL HISTORY OF OTHER DISEASES OF THE DIGESTIVE SYSTEM: Chronic | ICD-10-CM

## 2023-06-28 DIAGNOSIS — Z63.4 DISAPPEARANCE AND DEATH OF FAMILY MEMBER: ICD-10-CM

## 2023-06-28 PROCEDURE — 99213 OFFICE O/P EST LOW 20 MIN: CPT

## 2023-06-28 SDOH — SOCIAL STABILITY - SOCIAL INSECURITY: DISSAPEARANCE AND DEATH OF FAMILY MEMBER: Z63.4

## 2023-06-28 NOTE — CONSULT LETTER
[Dear  ___] : Dear  [unfilled], [Courtesy Letter:] : I had the pleasure of seeing your patient, [unfilled], in my office today. [Please see my note below.] : Please see my note below. [Consult Closing:] : Thank you very much for allowing me to participate in the care of this patient.  If you have any questions, please do not hesitate to contact me. [Sincerely,] : Sincerely, [FreeTextEntry2] : Dr. Nruia Barraza [FreeTextEntry3] : Dr. NORRIS Zelaya

## 2023-06-28 NOTE — ASSESSMENT
[FreeTextEntry1] : Patient with heterozygous mutation of Factor V Leiden with no previous thromboembolic complications even during and after multiple surgeries.\par The only recommendation I would make at this point will be just standard prophylaxis for DVT/PE. She has done well previously with 5 days of Lovenox 40 mg SQ, starting 12 -24 hours after the surgery. I would not recommend more aggressive intervention given that she is already complaining of some easy bruisability on her lower legs (although I haven't seen any today). \par The patient will continue to be followed by her PMD and other specialists and return to us as needed.\par \par All questions answered.

## 2023-06-28 NOTE — PHYSICAL EXAM
[Ambulatory and capable of all self care but unable to carry out any work activities] : Status 2- Ambulatory and capable of all self care but unable to carry out any work activities. Up and about more than 50% of waking hours [Normal] : no peripheral adenopathy appreciated [de-identified] : Arthritic changes as before [de-identified] : Using a walker for ambulation and stability.

## 2023-06-28 NOTE — HISTORY OF PRESENT ILLNESS
[de-identified] : The patient is coming for a follow up for her Factor V Leiden. \par She was seen in the past prior to other operations for "clearance" before surgery. She is scheduled for a D & C in a couple of weeks for vaginal spotting (yellowish to reddish).\par The patient has been on Rinvoq for her RA. She started seeing more bruising mostly on the legs about a month ago. She is on aspirin 81 mg.\par In general, she is feeling "terrible". She is having diffuse aches and pains. Back surgery has been considered but the results being questionable, she has not proceeded with it.\par And now, she is having problems with the right hip which has been replaced but apparently there is no more cartilage left and the plan was to consider further surgery. \par \par

## 2023-06-28 NOTE — REVIEW OF SYSTEMS
[Fatigue] : fatigue [Vision Problems] : vision problems [Loss of Hearing] : loss of hearing [Palpitations] : palpitations [SOB on Exertion] : shortness of breath during exertion [Constipation] : constipation [Joint Pain] : joint pain [Joint Stiffness] : joint stiffness [Dizziness] : dizziness [Insomnia] : insomnia [Easy Bruising] : a tendency for easy bruising [Negative] : Genitourinary [FreeTextEntry4] : Uses hearing aids [de-identified] : Mostly lower extremities

## 2023-06-29 DIAGNOSIS — G47.33 OBSTRUCTIVE SLEEP APNEA (ADULT) (PEDIATRIC): ICD-10-CM

## 2023-06-29 DIAGNOSIS — D47.3 ESSENTIAL (HEMORRHAGIC) THROMBOCYTHEMIA: ICD-10-CM

## 2023-07-06 ENCOUNTER — NON-APPOINTMENT (OUTPATIENT)
Age: 78
End: 2023-07-06

## 2023-07-14 ENCOUNTER — OUTPATIENT (OUTPATIENT)
Dept: INPATIENT UNIT | Facility: HOSPITAL | Age: 78
LOS: 1 days | Discharge: HOME | End: 2023-07-14
Payer: MEDICARE

## 2023-07-14 VITALS — HEART RATE: 72 BPM | SYSTOLIC BLOOD PRESSURE: 133 MMHG | RESPIRATION RATE: 14 BRPM | DIASTOLIC BLOOD PRESSURE: 58 MMHG

## 2023-07-14 VITALS
WEIGHT: 136.03 LBS | DIASTOLIC BLOOD PRESSURE: 67 MMHG | SYSTOLIC BLOOD PRESSURE: 159 MMHG | HEIGHT: 61 IN | OXYGEN SATURATION: 99 % | RESPIRATION RATE: 17 BRPM | TEMPERATURE: 96 F | HEART RATE: 71 BPM

## 2023-07-14 DIAGNOSIS — Z87.19 PERSONAL HISTORY OF OTHER DISEASES OF THE DIGESTIVE SYSTEM: Chronic | ICD-10-CM

## 2023-07-14 DIAGNOSIS — N85.00 ENDOMETRIAL HYPERPLASIA, UNSPECIFIED: ICD-10-CM

## 2023-07-14 DIAGNOSIS — Z96.641 PRESENCE OF RIGHT ARTIFICIAL HIP JOINT: Chronic | ICD-10-CM

## 2023-07-14 DIAGNOSIS — N95.0 POSTMENOPAUSAL BLEEDING: ICD-10-CM

## 2023-07-14 DIAGNOSIS — Z87.442 PERSONAL HISTORY OF URINARY CALCULI: Chronic | ICD-10-CM

## 2023-07-14 DIAGNOSIS — N70.92 OOPHORITIS, UNSPECIFIED: Chronic | ICD-10-CM

## 2023-07-14 DIAGNOSIS — Z98.49 CATARACT EXTRACTION STATUS, UNSPECIFIED EYE: Chronic | ICD-10-CM

## 2023-07-14 DIAGNOSIS — Z98.890 OTHER SPECIFIED POSTPROCEDURAL STATES: Chronic | ICD-10-CM

## 2023-07-14 PROCEDURE — 88305 TISSUE EXAM BY PATHOLOGIST: CPT | Mod: 26

## 2023-07-14 PROCEDURE — 88305 TISSUE EXAM BY PATHOLOGIST: CPT

## 2023-07-14 RX ORDER — ESOMEPRAZOLE MAGNESIUM 40 MG/1
1 CAPSULE, DELAYED RELEASE ORAL
Refills: 0 | DISCHARGE

## 2023-07-14 RX ORDER — HYDROMORPHONE HYDROCHLORIDE 2 MG/ML
0.5 INJECTION INTRAMUSCULAR; INTRAVENOUS; SUBCUTANEOUS
Refills: 0 | Status: DISCONTINUED | OUTPATIENT
Start: 2023-07-14 | End: 2023-07-14

## 2023-07-14 RX ORDER — OXYCODONE AND ACETAMINOPHEN 5; 325 MG/1; MG/1
1 TABLET ORAL ONCE
Refills: 0 | Status: DISCONTINUED | OUTPATIENT
Start: 2023-07-14 | End: 2023-07-14

## 2023-07-14 RX ORDER — ONDANSETRON 8 MG/1
4 TABLET, FILM COATED ORAL ONCE
Refills: 0 | Status: DISCONTINUED | OUTPATIENT
Start: 2023-07-14 | End: 2023-07-14

## 2023-07-14 RX ORDER — RIZATRIPTAN BENZOATE 5 MG/1
1 TABLET ORAL
Qty: 0 | Refills: 0 | DISCHARGE

## 2023-07-14 RX ORDER — HYDROCHLOROTHIAZIDE 25 MG
1 TABLET ORAL
Refills: 0 | DISCHARGE

## 2023-07-14 RX ORDER — SUCRALFATE 1 G
1 TABLET ORAL
Refills: 0 | DISCHARGE

## 2023-07-14 RX ORDER — CYCLOBENZAPRINE HYDROCHLORIDE 10 MG/1
1 TABLET, FILM COATED ORAL
Refills: 0 | DISCHARGE

## 2023-07-14 RX ORDER — ROSUVASTATIN CALCIUM 5 MG/1
1 TABLET ORAL
Qty: 0 | Refills: 0 | DISCHARGE

## 2023-07-14 RX ORDER — MECLIZINE HCL 12.5 MG
1 TABLET ORAL
Qty: 0 | Refills: 0 | DISCHARGE

## 2023-07-14 RX ORDER — TRAMADOL HYDROCHLORIDE 50 MG/1
0 TABLET ORAL
Qty: 0 | Refills: 0 | DISCHARGE

## 2023-07-14 RX ORDER — UPADACITINIB 45 MG/1
1 TABLET, EXTENDED RELEASE ORAL
Refills: 0 | DISCHARGE

## 2023-07-14 RX ORDER — LAMOTRIGINE 25 MG/1
1 TABLET, ORALLY DISINTEGRATING ORAL
Qty: 0 | Refills: 0 | DISCHARGE

## 2023-07-14 RX ORDER — ASPIRIN/CALCIUM CARB/MAGNESIUM 324 MG
1 TABLET ORAL
Refills: 0 | DISCHARGE

## 2023-07-14 RX ORDER — SODIUM CHLORIDE 9 MG/ML
1000 INJECTION, SOLUTION INTRAVENOUS
Refills: 0 | Status: DISCONTINUED | OUTPATIENT
Start: 2023-07-14 | End: 2023-07-14

## 2023-07-14 RX ORDER — DILTIAZEM HCL 120 MG
1 CAPSULE, EXT RELEASE 24 HR ORAL
Refills: 0 | DISCHARGE

## 2023-07-14 RX ORDER — METOPROLOL TARTRATE 50 MG
1 TABLET ORAL
Refills: 0 | DISCHARGE

## 2023-07-14 NOTE — BRIEF OPERATIVE NOTE - OPERATION/FINDINGS
Normal external genitalia. Stenotic cervix. Mobile, anteverted uterus, sounded to 7cm. polyp arising from right lateral uterine wall. Bilateral tubal ostia visualized.

## 2023-07-14 NOTE — ASU DISCHARGE PLAN (ADULT/PEDIATRIC) - BATHING
2 weeks/Shower only Major depressive disorder with current active episode, unspecified depression episode severity, unspecified whether recurrent

## 2023-07-14 NOTE — ASU PATIENT PROFILE, ADULT - FALL HARM RISK - RISK INTERVENTIONS

## 2023-07-14 NOTE — PRE-ANESTHESIA EVALUATION ADULT - NSANTHPMHFT_GEN_ALL_CORE
Chart reviewed, PAST and Med Evaluation seen, pt interviewed and examined. Chart reviewed, PAST and Med/Cardiology/Hematology Evaluation seen, pt interviewed and examined. Labs, EKG, Chest X ray report seen. Patient

## 2023-07-14 NOTE — ASU PATIENT PROFILE, ADULT - NSICDXPASTMEDICALHX_GEN_ALL_CORE_FT
minimal PAST MEDICAL HISTORY:  Abnormal cholesterol test     Acquired cataract     Acute depression     Acute gastritis     Atypical migraine     Chronic GERD     Factor V Leiden     Generalized OA     H/O vertigo     Mild HTN     MS (multiple sclerosis)     Rheumatoid arthritis

## 2023-07-14 NOTE — BRIEF OPERATIVE NOTE - NSICDXBRIEFPROCEDURE_GEN_ALL_CORE_FT
PROCEDURES:  Hysteroscopy, using Symphion bipolar tissue removal system, with dilation and curettage of uterus 14-Jul-2023 17:38:58  Sandy Li  Exam under anesthesia, pelvis 14-Jul-2023 17:39:24  Sandy Li

## 2023-07-14 NOTE — ASU DISCHARGE PLAN (ADULT/PEDIATRIC) - CARE PROVIDER_API CALL
Nuria Otero  Obstetrics and Gynecology  [address], [place] [address]  Phone: [phone]  Fax: [phone]  Follow Up Time: 2 weeks

## 2023-07-14 NOTE — ASU DISCHARGE PLAN (ADULT/PEDIATRIC) - NS MD DC FALL RISK RISK
For information on Fall & Injury Prevention, visit: [email or web address] OR  [email or web address] OR  https://www.cdc.gov/steadi/patient.html

## 2023-07-18 DIAGNOSIS — Z96.641 PRESENCE OF RIGHT ARTIFICIAL HIP JOINT: ICD-10-CM

## 2023-07-18 DIAGNOSIS — N88.2 STRICTURE AND STENOSIS OF CERVIX UTERI: ICD-10-CM

## 2023-07-18 DIAGNOSIS — I10 ESSENTIAL (PRIMARY) HYPERTENSION: ICD-10-CM

## 2023-07-18 DIAGNOSIS — Z88.0 ALLERGY STATUS TO PENICILLIN: ICD-10-CM

## 2023-07-18 DIAGNOSIS — Z90.49 ACQUIRED ABSENCE OF OTHER SPECIFIED PARTS OF DIGESTIVE TRACT: ICD-10-CM

## 2023-07-18 DIAGNOSIS — M19.90 UNSPECIFIED OSTEOARTHRITIS, UNSPECIFIED SITE: ICD-10-CM

## 2023-07-18 DIAGNOSIS — N85.4 MALPOSITION OF UTERUS: ICD-10-CM

## 2023-07-18 DIAGNOSIS — Z79.82 LONG TERM (CURRENT) USE OF ASPIRIN: ICD-10-CM

## 2023-07-18 DIAGNOSIS — K21.9 GASTRO-ESOPHAGEAL REFLUX DISEASE WITHOUT ESOPHAGITIS: ICD-10-CM

## 2023-07-18 DIAGNOSIS — M06.9 RHEUMATOID ARTHRITIS, UNSPECIFIED: ICD-10-CM

## 2023-07-18 DIAGNOSIS — G43.809 OTHER MIGRAINE, NOT INTRACTABLE, WITHOUT STATUS MIGRAINOSUS: ICD-10-CM

## 2023-07-18 DIAGNOSIS — N84.0 POLYP OF CORPUS UTERI: ICD-10-CM

## 2023-07-18 DIAGNOSIS — F32.A DEPRESSION, UNSPECIFIED: ICD-10-CM

## 2023-07-18 DIAGNOSIS — G35 MULTIPLE SCLEROSIS: ICD-10-CM

## 2023-07-18 DIAGNOSIS — Z88.2 ALLERGY STATUS TO SULFONAMIDES: ICD-10-CM

## 2023-07-18 DIAGNOSIS — N95.0 POSTMENOPAUSAL BLEEDING: ICD-10-CM

## 2023-07-18 LAB — SURGICAL PATHOLOGY STUDY: SIGNIFICANT CHANGE UP

## 2023-07-31 ENCOUNTER — APPOINTMENT (OUTPATIENT)
Dept: ORTHOPEDIC SURGERY | Facility: CLINIC | Age: 78
End: 2023-07-31
Payer: MEDICARE

## 2023-07-31 DIAGNOSIS — S89.92XA UNSPECIFIED INJURY OF LEFT LOWER LEG, INITIAL ENCOUNTER: ICD-10-CM

## 2023-07-31 PROCEDURE — 99213 OFFICE O/P EST LOW 20 MIN: CPT

## 2023-07-31 PROCEDURE — L1830: CPT | Mod: KV,KX,LT

## 2023-07-31 NOTE — HISTORY OF PRESENT ILLNESS
[de-identified] : 78-year-old female presents with left knee pain.  This weekend, patient fell, landing directly on her knee.  Since then she has had pain while walking.  Patient states she has taken anti-inflammatories for pain relief.  Patient ambulates with a walker since she has had issues with her back.  Patient denies any past injuries or surgeries to the left knee.

## 2023-07-31 NOTE — PHYSICAL EXAM
[de-identified] : Physical exam of left knee: -No erythema, edema, ecchymosis present.  Healing abrasion noted over anterior aspect of joint.  Skin intact -TTP over lateral aspect of patella -Calf is soft and nontender -Negative Tye's, negative anterior drawer, patient able to straight leg raise -Varus and valgus stability -Full ROM, pain with flexion  -+2 posterior tibialis pulse -Sensation intact to light touch

## 2023-07-31 NOTE — DATA REVIEWED
[FreeTextEntry1] : X-ray images were obtained at the office today reveal no acute fractures, discussions, bony abnormalities of the left knee.

## 2023-07-31 NOTE — DISCUSSION/SUMMARY
[de-identified] : Patient has left knee injury.  At this time, an MRI is warranted for further evaluation of the knee and to rule out any occult fracture of the patella.  In the meantime, patient was encouraged to buy a knee sleeve for support and stability, encouraged to take anti-inflammatories, and ice the knee.  She should modify her activity based on pain.  Patient will call 2 to 3 days after the MRI to discuss results.  We will discuss follow-up and treatment at that time.  Patient is agreeable to above plan all questions were answered today

## 2023-08-01 ENCOUNTER — APPOINTMENT (OUTPATIENT)
Dept: MRI IMAGING | Facility: CLINIC | Age: 78
End: 2023-08-01
Payer: MEDICARE

## 2023-08-01 PROCEDURE — 73721 MRI JNT OF LWR EXTRE W/O DYE: CPT | Mod: LT,MH

## 2023-08-03 ENCOUNTER — APPOINTMENT (OUTPATIENT)
Dept: PULMONOLOGY | Facility: CLINIC | Age: 78
End: 2023-08-03
Payer: MEDICARE

## 2023-08-03 DIAGNOSIS — R06.09 OTHER FORMS OF DYSPNEA: ICD-10-CM

## 2023-08-03 PROCEDURE — 99212 OFFICE O/P EST SF 10 MIN: CPT | Mod: 95

## 2023-08-11 ENCOUNTER — APPOINTMENT (OUTPATIENT)
Dept: ORTHOPEDIC SURGERY | Facility: CLINIC | Age: 78
End: 2023-08-11
Payer: MEDICARE

## 2023-08-11 PROCEDURE — 99213 OFFICE O/P EST LOW 20 MIN: CPT

## 2023-08-11 PROCEDURE — 73562 X-RAY EXAM OF KNEE 3: CPT | Mod: LT

## 2023-08-11 NOTE — HISTORY OF PRESENT ILLNESS
RN placing call to convey results of/MD recommendations for 7/12/19 labs, but unable to reach pt and unable to leave voicemail, as message on phone states \"this person is not receiving calls at this time\"; unable to reach letter sent.   [de-identified] : 78-year-old female presents for left knee follow-up.  Patient has a nondisplaced patella fracture.  She has been wearing the knee immobilizer for 2 weeks.  She is here for follow-up x-rays.  Patient states she has no pain while wearing the brace, when she takes the brace off she does feel pain.

## 2023-08-11 NOTE — DISCUSSION/SUMMARY
[de-identified] : Patient is healing well.  At this point advised patient to wear brace for an additional 4 weeks.  Patient will follow-up in 2 weeks for x-rays again and checkup.  Patient agrees above plan all questions were answered today

## 2023-08-11 NOTE — DATA REVIEWED
[FreeTextEntry1] : X-ray images were obtained at the office today.  AP, lateral, oblique views of the left knee reveal fracture is well aligned compared to last x-rays.

## 2023-08-25 ENCOUNTER — APPOINTMENT (OUTPATIENT)
Dept: ORTHOPEDIC SURGERY | Facility: CLINIC | Age: 78
End: 2023-08-25
Payer: MEDICARE

## 2023-08-25 PROCEDURE — 99213 OFFICE O/P EST LOW 20 MIN: CPT

## 2023-08-25 PROCEDURE — 73562 X-RAY EXAM OF KNEE 3: CPT | Mod: LT

## 2023-08-25 NOTE — PHYSICAL EXAM
[de-identified] : Physical exam of left knee: -No erythema, edema, ecchymosis present.  Skin intact -Patient has tenderness palpation over the quadriceps tendon.  No tenderness of the patella -Calf is soft and nontender -Patient able to straight leg raise -Varus and valgus stability -Full ROM with no pain -+2 posterior tibialis pulse -Sensation intact to light touch

## 2023-08-25 NOTE — DISCUSSION/SUMMARY
[de-identified] : At this time I transitioned patient from the knee immobilizer to a hinged knee brace.  I gave patient a prescription to pick it up from medical equipment store or can order one from [employer].  Patient was also given a prescription for physical therapy, which she will start to get her strength back.  Patient can continue taking anti-inflammatories or Tylenol as needed for pain relief.  Patient will follow-up in 3 to 4 weeks to assess her progress in physical therapy.  Patient is going to [place] in 4 weeks.  Patient also poses concerns of weakness numbness and tingling in both legs, patient has had the symptoms before that have been from her back, we did discuss the symptoms last visit and I did not strongly encourage her to visit her spinal doctor, at this visit I also encouraged her again to follow-up for the symptoms.

## 2023-08-25 NOTE — HISTORY OF PRESENT ILLNESS
[de-identified] : 78-year-old female presents for follow-up for her left patella fracture.  Today, patient states her legs feel weak.  Patient been wearing the knee immobilizer.  Patient says her knee is feeling better.

## 2023-08-25 NOTE — DATA REVIEWED
[FreeTextEntry1] : X-ray images were taken at the office today.  AP, lateral, oblique views left knee reveal no significant changes of the patella since last x-ray

## 2023-09-15 ENCOUNTER — APPOINTMENT (OUTPATIENT)
Dept: ORTHOPEDIC SURGERY | Facility: CLINIC | Age: 78
End: 2023-09-15
Payer: MEDICARE

## 2023-09-15 DIAGNOSIS — S82.002A UNSPECIFIED FRACTURE OF LEFT PATELLA, INITIAL ENCOUNTER FOR CLOSED FRACTURE: ICD-10-CM

## 2023-09-15 PROCEDURE — 73562 X-RAY EXAM OF KNEE 3: CPT | Mod: LT

## 2023-09-15 PROCEDURE — 99213 OFFICE O/P EST LOW 20 MIN: CPT

## 2023-09-26 NOTE — ASSESSMENT
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\par  Trans tele (-) past and Thall (-) past lost 25lbs had colon and egd reported as (-) by pt  and thyroids normal  still rare palp 1 beat no syncope check labs creat and K + normal and renal sono (-) cough with ace and switched to olmersartan and still cough \par \par get  sporadic sob needs deep breath said felt like this with pericarditisCBC ESR and BMP T4 TSH  all (-) \par feels better with colcchicne \par pthall (-) \par dizzy in office normal BP and heart rate and ekg seems more neuro related \par saw neuro and rec d/c inderal which then devloped sinus tach and elevated BP so added back inderal at half dose a day to attempt to taper and then still developed elevate BP so added labetolol 200 q12, today pts /80 and HR 98 \par she note does  feel less dizzy off inderal but has been on \par will d/c norvasc and add cardizemm 180 a day if works then could retry hyrin and or hdralazine \par still c/o of dizziness and exertional SOB has pulse ox of 99 in office, and (-) thall and echo and nl ESR\par \par Plan Cardiac CCTA \par see pulomonary \par if ccta neg will d/c cardizem and trt catapress or hyrtin \par d/c colchicine since (-) rub and ESR (-) \par if above (-) cnsider tilt althoughtnot orthostatic unk

## 2023-11-11 ENCOUNTER — RESULT REVIEW (OUTPATIENT)
Age: 78
End: 2023-11-11

## 2023-11-11 ENCOUNTER — OUTPATIENT (OUTPATIENT)
Dept: OUTPATIENT SERVICES | Facility: HOSPITAL | Age: 78
LOS: 1 days | End: 2023-11-11
Payer: MEDICARE

## 2023-11-11 DIAGNOSIS — Z87.19 PERSONAL HISTORY OF OTHER DISEASES OF THE DIGESTIVE SYSTEM: Chronic | ICD-10-CM

## 2023-11-11 DIAGNOSIS — Z98.49 CATARACT EXTRACTION STATUS, UNSPECIFIED EYE: Chronic | ICD-10-CM

## 2023-11-11 DIAGNOSIS — Z87.442 PERSONAL HISTORY OF URINARY CALCULI: Chronic | ICD-10-CM

## 2023-11-11 DIAGNOSIS — R92.2 INCONCLUSIVE MAMMOGRAM: ICD-10-CM

## 2023-11-11 DIAGNOSIS — Z98.890 OTHER SPECIFIED POSTPROCEDURAL STATES: Chronic | ICD-10-CM

## 2023-11-11 DIAGNOSIS — Z96.641 PRESENCE OF RIGHT ARTIFICIAL HIP JOINT: Chronic | ICD-10-CM

## 2023-11-11 DIAGNOSIS — Z12.31 ENCOUNTER FOR SCREENING MAMMOGRAM FOR MALIGNANT NEOPLASM OF BREAST: ICD-10-CM

## 2023-11-11 DIAGNOSIS — N70.92 OOPHORITIS, UNSPECIFIED: Chronic | ICD-10-CM

## 2023-11-11 PROCEDURE — 77063 BREAST TOMOSYNTHESIS BI: CPT | Mod: 26

## 2023-11-11 PROCEDURE — 76641 ULTRASOUND BREAST COMPLETE: CPT | Mod: 26,50

## 2023-11-11 PROCEDURE — 76641 ULTRASOUND BREAST COMPLETE: CPT | Mod: 50

## 2023-11-11 PROCEDURE — 77067 SCR MAMMO BI INCL CAD: CPT | Mod: 26

## 2023-11-11 PROCEDURE — 77067 SCR MAMMO BI INCL CAD: CPT

## 2023-11-11 PROCEDURE — 77063 BREAST TOMOSYNTHESIS BI: CPT

## 2023-11-12 DIAGNOSIS — Z12.31 ENCOUNTER FOR SCREENING MAMMOGRAM FOR MALIGNANT NEOPLASM OF BREAST: ICD-10-CM

## 2023-11-12 DIAGNOSIS — R92.2 INCONCLUSIVE MAMMOGRAM: ICD-10-CM

## 2023-11-14 ENCOUNTER — APPOINTMENT (OUTPATIENT)
Dept: ORTHOPEDIC SURGERY | Facility: CLINIC | Age: 78
End: 2023-11-14

## 2023-11-28 ENCOUNTER — APPOINTMENT (OUTPATIENT)
Dept: BREAST CENTER | Facility: CLINIC | Age: 78
End: 2023-11-28
Payer: MEDICARE

## 2023-11-28 VITALS
HEIGHT: 60 IN | WEIGHT: 140 LBS | SYSTOLIC BLOOD PRESSURE: 136 MMHG | BODY MASS INDEX: 27.48 KG/M2 | DIASTOLIC BLOOD PRESSURE: 80 MMHG

## 2023-11-28 VITALS
SYSTOLIC BLOOD PRESSURE: 124 MMHG | DIASTOLIC BLOOD PRESSURE: 80 MMHG | WEIGHT: 140 LBS | HEIGHT: 60 IN | BODY MASS INDEX: 27.48 KG/M2

## 2023-11-28 DIAGNOSIS — N64.4 MASTODYNIA: ICD-10-CM

## 2023-11-28 DIAGNOSIS — R92.30 DENSE BREASTS, UNSPECIFIED: ICD-10-CM

## 2023-11-28 PROCEDURE — 99214 OFFICE O/P EST MOD 30 MIN: CPT

## 2023-12-29 ENCOUNTER — OUTPATIENT (OUTPATIENT)
Dept: OUTPATIENT SERVICES | Facility: HOSPITAL | Age: 78
LOS: 1 days | End: 2023-12-29
Payer: MEDICARE

## 2023-12-29 ENCOUNTER — APPOINTMENT (OUTPATIENT)
Dept: HEMATOLOGY ONCOLOGY | Facility: CLINIC | Age: 78
End: 2023-12-29
Payer: MEDICARE

## 2023-12-29 VITALS
TEMPERATURE: 99.1 F | WEIGHT: 139 LBS | SYSTOLIC BLOOD PRESSURE: 142 MMHG | BODY MASS INDEX: 27.29 KG/M2 | HEART RATE: 79 BPM | HEIGHT: 60 IN | DIASTOLIC BLOOD PRESSURE: 67 MMHG

## 2023-12-29 DIAGNOSIS — Z87.19 PERSONAL HISTORY OF OTHER DISEASES OF THE DIGESTIVE SYSTEM: Chronic | ICD-10-CM

## 2023-12-29 DIAGNOSIS — Z98.49 CATARACT EXTRACTION STATUS, UNSPECIFIED EYE: Chronic | ICD-10-CM

## 2023-12-29 DIAGNOSIS — N70.92 OOPHORITIS, UNSPECIFIED: Chronic | ICD-10-CM

## 2023-12-29 DIAGNOSIS — Z96.641 PRESENCE OF RIGHT ARTIFICIAL HIP JOINT: Chronic | ICD-10-CM

## 2023-12-29 DIAGNOSIS — Z87.442 PERSONAL HISTORY OF URINARY CALCULI: Chronic | ICD-10-CM

## 2023-12-29 DIAGNOSIS — D68.51 ACTIVATED PROTEIN C RESISTANCE: ICD-10-CM

## 2023-12-29 DIAGNOSIS — Z98.890 OTHER SPECIFIED POSTPROCEDURAL STATES: Chronic | ICD-10-CM

## 2023-12-29 PROCEDURE — 99213 OFFICE O/P EST LOW 20 MIN: CPT

## 2023-12-30 DIAGNOSIS — D68.51 ACTIVATED PROTEIN C RESISTANCE: ICD-10-CM

## 2023-12-31 NOTE — PHYSICAL EXAM
[Ambulatory and capable of all self care but unable to carry out any work activities] : Status 2- Ambulatory and capable of all self care but unable to carry out any work activities. Up and about more than 50% of waking hours [Normal] : affect appropriate [de-identified] : May be trace edema bilaterally [de-identified] : Some arthritic changes noted

## 2023-12-31 NOTE — ASSESSMENT
[FreeTextEntry1] : Heterozygous Factor V Leiden with no previous history of any thromboembolic event. The patient is presently just on aspirin. Apparently, she is getting ready for another surgery in a couple of months. Since she had multiple interventions in the past with no particular complications, the patient will just require standard DVT prophylaxis. Previously, she did quite well with no particular complications being treated with 5 days of Lovenox 40 mg SQ for 5 days, starting 12-24 hours post-surgery. Early ambulation will also be beneficial. Based on patient's past previous personal experience. more aggressive intervention may not be necessary. All questions answered.  The patient will follow up as needed.

## 2023-12-31 NOTE — REVIEW OF SYSTEMS
[Fatigue] : fatigue [Recent Change In Weight] : ~T recent weight change [Vision Problems] : vision problems [Lower Ext Edema] : lower extremity edema [SOB on Exertion] : shortness of breath during exertion [Joint Pain] : joint pain [Joint Stiffness] : joint stiffness [Negative] : Heme/Lymph [Chest Pain] : no chest pain [FreeTextEntry2] : Has gained a few pounds [FreeTextEntry6] : Occasionally [FreeTextEntry3] : Cataracts (one removed), glaucoma [FreeTextEntry7] : Burning sensation in the stomach

## 2023-12-31 NOTE — HISTORY OF PRESENT ILLNESS
[Disease:__________________________] : Disease: [unfilled] [de-identified] : The patient is coming for a follow up for her history of heterozygous factor V Leiden. The patient has never developed any clots. She has been kept on baby aspirin. Now, the patient is getting scheduled for the right hip replacement with a new prosthesis. She also had surgery on her back, microdiscectomy, last year. She did not have any thrombotic or bleeding complications. Apparently, she did not improve much symptomatically and there was consideration of repeat surgery but that was eventually discarded. She is denying any new bleeding or thrombotic events.

## 2024-01-07 NOTE — ASU PATIENT PROFILE, ADULT - BLOOD AVOIDANCE/RESTRICTIONS, PROFILE
Dionicio Bess is a 54 y.o. male with multiple medical comorbidities, an elevated troponin, elevated ETOH, new CT head findings concerning for infarct, and a left sided trimalleolar ankle fracture sustained after a ground level fall yesterday, closed and at neurovascular baseline for the LLE.     - Okay for diet   - NWB LLE  - Eliquis 5 BID  - PT/OT   - SW for DC planning     Dispo: stable for dc from ortho perspective     Dispo: pending discharge recs and assistance by GEORGE/ERVIN      none

## 2024-01-25 ENCOUNTER — APPOINTMENT (OUTPATIENT)
Dept: NEUROLOGY | Facility: CLINIC | Age: 79
End: 2024-01-25
Payer: MEDICARE

## 2024-01-25 VITALS
BODY MASS INDEX: 26.24 KG/M2 | HEART RATE: 85 BPM | SYSTOLIC BLOOD PRESSURE: 135 MMHG | DIASTOLIC BLOOD PRESSURE: 77 MMHG | HEIGHT: 61 IN | OXYGEN SATURATION: 99 % | WEIGHT: 139 LBS

## 2024-01-25 PROCEDURE — 99214 OFFICE O/P EST MOD 30 MIN: CPT

## 2024-01-25 RX ORDER — DENOSUMAB 60 MG/ML
60 INJECTION SUBCUTANEOUS
Refills: 0 | Status: ACTIVE | COMMUNITY

## 2024-01-25 RX ORDER — ENOXAPARIN SODIUM 40 MG/.4ML
40 INJECTION, SOLUTION SUBCUTANEOUS
Qty: 5 | Refills: 0 | Status: DISCONTINUED | COMMUNITY
Start: 2023-06-28 | End: 2024-01-25

## 2024-01-25 RX ORDER — ROSUVASTATIN CALCIUM 10 MG/1
10 TABLET, FILM COATED ORAL DAILY
Qty: 90 | Refills: 3 | Status: DISCONTINUED | COMMUNITY
End: 2024-01-25

## 2024-01-25 RX ORDER — ESTRADIOL 0.1 MG/G
0.1 CREAM VAGINAL
Refills: 0 | Status: ACTIVE | COMMUNITY

## 2024-01-25 RX ORDER — PREDNISONE 1 MG/1
1 TABLET ORAL
Refills: 0 | Status: ACTIVE | COMMUNITY

## 2024-01-25 RX ORDER — METOPROLOL TARTRATE 25 MG/1
25 TABLET, FILM COATED ORAL
Refills: 0 | Status: ACTIVE | COMMUNITY

## 2024-01-25 RX ORDER — TRAMADOL HYDROCHLORIDE 50 MG/1
50 TABLET, COATED ORAL
Refills: 0 | Status: ACTIVE | COMMUNITY

## 2024-01-25 RX ORDER — VALACYCLOVIR 500 MG/1
TABLET, FILM COATED ORAL
Refills: 0 | Status: ACTIVE | COMMUNITY

## 2024-01-25 RX ORDER — ASPIRIN 81 MG
81 TABLET, DELAYED RELEASE (ENTERIC COATED) ORAL DAILY
Refills: 0 | Status: DISCONTINUED | COMMUNITY
End: 2024-01-25

## 2024-01-25 RX ORDER — MECLIZINE HYDROCHLORIDE 12.5 MG/1
12.5 TABLET ORAL
Refills: 0 | Status: ACTIVE | COMMUNITY

## 2024-01-25 RX ORDER — CYCLOBENZAPRINE HCL 5 MG
5 TABLET ORAL
Refills: 0 | Status: ACTIVE | COMMUNITY

## 2024-01-25 RX ORDER — ASPIRIN 81 MG
81 TABLET, DELAYED RELEASE (ENTERIC COATED) ORAL
Refills: 0 | Status: ACTIVE | COMMUNITY

## 2024-01-25 RX ORDER — ROSUVASTATIN CALCIUM 10 MG/1
10 TABLET, FILM COATED ORAL
Qty: 90 | Refills: 3 | Status: DISCONTINUED | COMMUNITY
Start: 2021-06-15 | End: 2024-01-25

## 2024-01-25 RX ORDER — CYCLOBENZAPRINE HYDROCHLORIDE 5 MG/1
5 TABLET, FILM COATED ORAL
Qty: 90 | Refills: 0 | Status: DISCONTINUED | COMMUNITY
Start: 2020-03-03 | End: 2024-01-25

## 2024-01-25 RX ORDER — LEVOTHYROXINE SODIUM 25 UG/1
25 TABLET ORAL DAILY
Refills: 0 | Status: DISCONTINUED | COMMUNITY
End: 2024-01-25

## 2024-01-25 RX ORDER — ROSUVASTATIN CALCIUM 20 MG/1
20 TABLET, FILM COATED ORAL
Refills: 0 | Status: ACTIVE | COMMUNITY

## 2024-01-25 RX ORDER — NORTRIPTYLINE HYDROCHLORIDE 10 MG/1
10 CAPSULE ORAL AT BEDTIME
Qty: 60 | Refills: 5 | Status: DISCONTINUED | COMMUNITY
Start: 2021-11-15 | End: 2024-01-25

## 2024-01-25 RX ORDER — CONJUGATED ESTROGENS 0.62 MG/G
0.62 CREAM VAGINAL
Qty: 1 | Refills: 0 | Status: DISCONTINUED | COMMUNITY
Start: 2020-11-16 | End: 2024-01-25

## 2024-01-25 RX ORDER — BIMATOPROST 0.1 MG/ML
0.01 SOLUTION/ DROPS OPHTHALMIC
Refills: 0 | Status: ACTIVE | COMMUNITY

## 2024-01-25 RX ORDER — LAMOTRIGINE 100 MG/1
100 TABLET ORAL
Refills: 0 | Status: ACTIVE | COMMUNITY

## 2024-01-25 RX ORDER — UPADACITINIB 15 MG/1
15 TABLET, EXTENDED RELEASE ORAL
Refills: 0 | Status: ACTIVE | COMMUNITY

## 2024-01-25 NOTE — ASSESSMENT
[FreeTextEntry1] : 77 yo RHF w/ h/o CAD, HTN, DLD, lumbar radic s/p L L5-S1 discectomy, chronic nonspecific myalgias, arthalgias and "weakness" with inconsistent exam here for "neurologic clearance" for hip surgery.  Since there is no confirmed diagnosis of a neurologic disease to my knowledge, there is no neurologic contraindication for surgery at this time.   Recommendations: - no neurologic contraindication for surgery - f/u with rheumatologist for further management of arthalgias/myalgias - RTC PRN

## 2024-01-25 NOTE — HISTORY OF PRESENT ILLNESS
[FreeTextEntry1] : Since her last visit in 2022, Ms. Ojeda had undergone L L5-S1 microdiscectomy initially with partial success then with extensive PT had much improvement in back pain and RLE weakness.  More recently had recall of L hip replacement lining and is scheduled for THR with her orthopedist at [facility] and was asked to f/u for "clearance" for hip surgery.  Currently continues to complain of diffuse myalgias, arthralgias following with her rheumatologist.  No actual focal deficits.  Reports problems with ambulation and balance similar to prior complaints but still ambulating independently without any assistive devices.  Has had extensive neurologic w/u in the past including 2nd and 3rd opinions all negative with no neurologic issue suspected other than presence of lumbar radiculopathy and currently has not had any new neurologic symptoms since.  Is otherwise stable in her usual state of health.

## 2024-01-25 NOTE — PHYSICAL EXAM
[FreeTextEntry1] : NAD. AOx3. Intact memory. Speech fluent, nondysarthric. CN 2 - 12 normal. Strength 5/5 b/l UE/LE w/ giveway weakness. NL tone, bulk. No abnl movements. DTRs 2+ throughout. Plantar response downgoing b/l. (-) Hoffmans, clonus. Sensory intact LT/PP, pain, temp, proprioception and vibration. NL FTN/HKS. No dysdiadokinesia. Gait narrow based/NL tandem. able to squat and stand without assistance.  Able to toe/heel walk without assistance although she attempts to grab on to examiner.

## 2024-02-03 ENCOUNTER — NON-APPOINTMENT (OUTPATIENT)
Age: 79
End: 2024-02-03

## 2024-02-16 ENCOUNTER — NON-APPOINTMENT (OUTPATIENT)
Age: 79
End: 2024-02-16

## 2024-02-20 ENCOUNTER — APPOINTMENT (OUTPATIENT)
Dept: PULMONOLOGY | Facility: CLINIC | Age: 79
End: 2024-02-20

## 2024-02-28 ENCOUNTER — APPOINTMENT (OUTPATIENT)
Dept: PULMONOLOGY | Facility: CLINIC | Age: 79
End: 2024-02-28
Payer: MEDICARE

## 2024-02-28 ENCOUNTER — LABORATORY RESULT (OUTPATIENT)
Age: 79
End: 2024-02-28

## 2024-02-28 VITALS
BODY MASS INDEX: 26.62 KG/M2 | HEIGHT: 61 IN | OXYGEN SATURATION: 98 % | SYSTOLIC BLOOD PRESSURE: 140 MMHG | DIASTOLIC BLOOD PRESSURE: 58 MMHG | HEART RATE: 77 BPM | WEIGHT: 141 LBS

## 2024-02-28 DIAGNOSIS — R53.82 CHRONIC FATIGUE, UNSPECIFIED: ICD-10-CM

## 2024-02-28 DIAGNOSIS — K74.00 HEPATIC FIBROSIS, UNSPECIFIED: ICD-10-CM

## 2024-02-28 DIAGNOSIS — G47.33 OBSTRUCTIVE SLEEP APNEA (ADULT) (PEDIATRIC): ICD-10-CM

## 2024-02-28 DIAGNOSIS — R06.02 SHORTNESS OF BREATH: ICD-10-CM

## 2024-02-28 PROCEDURE — 99213 OFFICE O/P EST LOW 20 MIN: CPT

## 2024-02-28 PROCEDURE — G2211 COMPLEX E/M VISIT ADD ON: CPT

## 2024-04-07 ENCOUNTER — EMERGENCY (EMERGENCY)
Facility: HOSPITAL | Age: 79
LOS: 0 days | Discharge: HOME | End: 2024-04-08
Attending: STUDENT IN AN ORGANIZED HEALTH CARE EDUCATION/TRAINING PROGRAM
Payer: MEDICARE

## 2024-04-07 VITALS
TEMPERATURE: 98 F | RESPIRATION RATE: 18 BRPM | HEART RATE: 76 BPM | SYSTOLIC BLOOD PRESSURE: 195 MMHG | DIASTOLIC BLOOD PRESSURE: 88 MMHG | OXYGEN SATURATION: 98 %

## 2024-04-07 DIAGNOSIS — R06.02 SHORTNESS OF BREATH: ICD-10-CM

## 2024-04-07 DIAGNOSIS — G35 MULTIPLE SCLEROSIS: ICD-10-CM

## 2024-04-07 DIAGNOSIS — M06.9 RHEUMATOID ARTHRITIS, UNSPECIFIED: ICD-10-CM

## 2024-04-07 DIAGNOSIS — Z88.0 ALLERGY STATUS TO PENICILLIN: ICD-10-CM

## 2024-04-07 DIAGNOSIS — Z87.442 PERSONAL HISTORY OF URINARY CALCULI: Chronic | ICD-10-CM

## 2024-04-07 DIAGNOSIS — R07.89 OTHER CHEST PAIN: ICD-10-CM

## 2024-04-07 DIAGNOSIS — N70.92 OOPHORITIS, UNSPECIFIED: Chronic | ICD-10-CM

## 2024-04-07 DIAGNOSIS — Z88.2 ALLERGY STATUS TO SULFONAMIDES: ICD-10-CM

## 2024-04-07 DIAGNOSIS — I10 ESSENTIAL (PRIMARY) HYPERTENSION: ICD-10-CM

## 2024-04-07 DIAGNOSIS — Z98.890 OTHER SPECIFIED POSTPROCEDURAL STATES: Chronic | ICD-10-CM

## 2024-04-07 DIAGNOSIS — Z98.49 CATARACT EXTRACTION STATUS, UNSPECIFIED EYE: Chronic | ICD-10-CM

## 2024-04-07 DIAGNOSIS — Z86.16 PERSONAL HISTORY OF COVID-19: ICD-10-CM

## 2024-04-07 DIAGNOSIS — Z87.19 PERSONAL HISTORY OF OTHER DISEASES OF THE DIGESTIVE SYSTEM: Chronic | ICD-10-CM

## 2024-04-07 DIAGNOSIS — Z96.641 PRESENCE OF RIGHT ARTIFICIAL HIP JOINT: Chronic | ICD-10-CM

## 2024-04-07 LAB
ALBUMIN SERPL ELPH-MCNC: 4.6 G/DL — SIGNIFICANT CHANGE UP (ref 3.5–5.2)
ALP SERPL-CCNC: 41 U/L — SIGNIFICANT CHANGE UP (ref 30–115)
ALT FLD-CCNC: 37 U/L — SIGNIFICANT CHANGE UP (ref 0–41)
ANION GAP SERPL CALC-SCNC: 13 MMOL/L — SIGNIFICANT CHANGE UP (ref 7–14)
AST SERPL-CCNC: 22 U/L — SIGNIFICANT CHANGE UP (ref 0–41)
BASOPHILS # BLD AUTO: 0.01 K/UL — SIGNIFICANT CHANGE UP (ref 0–0.2)
BASOPHILS NFR BLD AUTO: 0.1 % — SIGNIFICANT CHANGE UP (ref 0–1)
BILIRUB SERPL-MCNC: 0.3 MG/DL — SIGNIFICANT CHANGE UP (ref 0.2–1.2)
BUN SERPL-MCNC: 32 MG/DL — HIGH (ref 10–20)
CALCIUM SERPL-MCNC: 10.4 MG/DL — SIGNIFICANT CHANGE UP (ref 8.4–10.5)
CHLORIDE SERPL-SCNC: 100 MMOL/L — SIGNIFICANT CHANGE UP (ref 98–110)
CO2 SERPL-SCNC: 26 MMOL/L — SIGNIFICANT CHANGE UP (ref 17–32)
CREAT SERPL-MCNC: 0.8 MG/DL — SIGNIFICANT CHANGE UP (ref 0.7–1.5)
EGFR: 74 ML/MIN/1.73M2 — SIGNIFICANT CHANGE UP
EOSINOPHIL # BLD AUTO: 0.03 K/UL — SIGNIFICANT CHANGE UP (ref 0–0.7)
EOSINOPHIL NFR BLD AUTO: 0.3 % — SIGNIFICANT CHANGE UP (ref 0–8)
GLUCOSE SERPL-MCNC: 113 MG/DL — HIGH (ref 70–99)
HCT VFR BLD CALC: 34.6 % — LOW (ref 37–47)
HGB BLD-MCNC: 11.8 G/DL — LOW (ref 12–16)
IMM GRANULOCYTES NFR BLD AUTO: 1.1 % — HIGH (ref 0.1–0.3)
LYMPHOCYTES # BLD AUTO: 1.47 K/UL — SIGNIFICANT CHANGE UP (ref 1.2–3.4)
LYMPHOCYTES # BLD AUTO: 16.6 % — LOW (ref 20.5–51.1)
MCHC RBC-ENTMCNC: 31.6 PG — HIGH (ref 27–31)
MCHC RBC-ENTMCNC: 34.1 G/DL — SIGNIFICANT CHANGE UP (ref 32–37)
MCV RBC AUTO: 92.8 FL — SIGNIFICANT CHANGE UP (ref 81–99)
MONOCYTES # BLD AUTO: 0.71 K/UL — HIGH (ref 0.1–0.6)
MONOCYTES NFR BLD AUTO: 8 % — SIGNIFICANT CHANGE UP (ref 1.7–9.3)
NEUTROPHILS # BLD AUTO: 6.53 K/UL — HIGH (ref 1.4–6.5)
NEUTROPHILS NFR BLD AUTO: 73.9 % — SIGNIFICANT CHANGE UP (ref 42.2–75.2)
NRBC # BLD: 0 /100 WBCS — SIGNIFICANT CHANGE UP (ref 0–0)
PLATELET # BLD AUTO: 315 K/UL — SIGNIFICANT CHANGE UP (ref 130–400)
PMV BLD: 9 FL — SIGNIFICANT CHANGE UP (ref 7.4–10.4)
POTASSIUM SERPL-MCNC: 4.3 MMOL/L — SIGNIFICANT CHANGE UP (ref 3.5–5)
POTASSIUM SERPL-SCNC: 4.3 MMOL/L — SIGNIFICANT CHANGE UP (ref 3.5–5)
PROT SERPL-MCNC: 7.1 G/DL — SIGNIFICANT CHANGE UP (ref 6–8)
RBC # BLD: 3.73 M/UL — LOW (ref 4.2–5.4)
RBC # FLD: 13.2 % — SIGNIFICANT CHANGE UP (ref 11.5–14.5)
SODIUM SERPL-SCNC: 139 MMOL/L — SIGNIFICANT CHANGE UP (ref 135–146)
TROPONIN T, HIGH SENSITIVITY RESULT: 10 NG/L — SIGNIFICANT CHANGE UP (ref 6–13)
TROPONIN T, HIGH SENSITIVITY RESULT: 13 NG/L — SIGNIFICANT CHANGE UP (ref 6–13)
WBC # BLD: 8.85 K/UL — SIGNIFICANT CHANGE UP (ref 4.8–10.8)
WBC # FLD AUTO: 8.85 K/UL — SIGNIFICANT CHANGE UP (ref 4.8–10.8)

## 2024-04-07 PROCEDURE — 99285 EMERGENCY DEPT VISIT HI MDM: CPT

## 2024-04-07 PROCEDURE — 99223 1ST HOSP IP/OBS HIGH 75: CPT | Mod: FS

## 2024-04-07 PROCEDURE — 93010 ELECTROCARDIOGRAM REPORT: CPT

## 2024-04-07 PROCEDURE — 93017 CV STRESS TEST TRACING ONLY: CPT

## 2024-04-07 PROCEDURE — 36415 COLL VENOUS BLD VENIPUNCTURE: CPT

## 2024-04-07 PROCEDURE — 93306 TTE W/DOPPLER COMPLETE: CPT

## 2024-04-07 PROCEDURE — 80053 COMPREHEN METABOLIC PANEL: CPT

## 2024-04-07 PROCEDURE — G0378: CPT

## 2024-04-07 PROCEDURE — 85025 COMPLETE CBC W/AUTO DIFF WBC: CPT

## 2024-04-07 PROCEDURE — 71045 X-RAY EXAM CHEST 1 VIEW: CPT

## 2024-04-07 PROCEDURE — 84484 ASSAY OF TROPONIN QUANT: CPT

## 2024-04-07 PROCEDURE — A9500: CPT

## 2024-04-07 PROCEDURE — 71045 X-RAY EXAM CHEST 1 VIEW: CPT | Mod: 26

## 2024-04-07 PROCEDURE — 78452 HT MUSCLE IMAGE SPECT MULT: CPT | Mod: MC

## 2024-04-07 PROCEDURE — 93005 ELECTROCARDIOGRAM TRACING: CPT

## 2024-04-07 RX ORDER — CYCLOBENZAPRINE HYDROCHLORIDE 10 MG/1
5 TABLET, FILM COATED ORAL AT BEDTIME
Refills: 0 | Status: DISCONTINUED | OUTPATIENT
Start: 2024-04-07 | End: 2024-04-07

## 2024-04-07 RX ORDER — ASPIRIN/CALCIUM CARB/MAGNESIUM 324 MG
81 TABLET ORAL DAILY
Refills: 0 | Status: DISCONTINUED | OUTPATIENT
Start: 2024-04-07 | End: 2024-04-08

## 2024-04-07 RX ORDER — COLCHICINE 0.6 MG
0.3 TABLET ORAL DAILY
Refills: 0 | Status: DISCONTINUED | OUTPATIENT
Start: 2024-04-07 | End: 2024-04-08

## 2024-04-07 RX ORDER — VALSARTAN 80 MG/1
80 TABLET ORAL
Refills: 0 | Status: DISCONTINUED | OUTPATIENT
Start: 2024-04-07 | End: 2024-04-08

## 2024-04-07 RX ORDER — ATORVASTATIN CALCIUM 80 MG/1
80 TABLET, FILM COATED ORAL AT BEDTIME
Refills: 0 | Status: DISCONTINUED | OUTPATIENT
Start: 2024-04-07 | End: 2024-04-08

## 2024-04-07 RX ORDER — COLCHICINE 0.6 MG
0.6 TABLET ORAL DAILY
Refills: 0 | Status: DISCONTINUED | OUTPATIENT
Start: 2024-04-07 | End: 2024-04-07

## 2024-04-07 RX ORDER — LAMOTRIGINE 25 MG/1
100 TABLET, ORALLY DISINTEGRATING ORAL
Refills: 0 | Status: DISCONTINUED | OUTPATIENT
Start: 2024-04-07 | End: 2024-04-08

## 2024-04-07 RX ORDER — PANTOPRAZOLE SODIUM 20 MG/1
40 TABLET, DELAYED RELEASE ORAL
Refills: 0 | Status: DISCONTINUED | OUTPATIENT
Start: 2024-04-07 | End: 2024-04-08

## 2024-04-07 RX ORDER — CYCLOBENZAPRINE HYDROCHLORIDE 10 MG/1
10 TABLET, FILM COATED ORAL ONCE
Refills: 0 | Status: COMPLETED | OUTPATIENT
Start: 2024-04-07 | End: 2024-04-07

## 2024-04-07 RX ORDER — METOPROLOL TARTRATE 50 MG
25 TABLET ORAL
Refills: 0 | Status: DISCONTINUED | OUTPATIENT
Start: 2024-04-07 | End: 2024-04-08

## 2024-04-07 RX ORDER — DILTIAZEM HCL 120 MG
180 CAPSULE, EXT RELEASE 24 HR ORAL DAILY
Refills: 0 | Status: DISCONTINUED | OUTPATIENT
Start: 2024-04-07 | End: 2024-04-08

## 2024-04-07 RX ADMIN — Medication 25 MILLIGRAM(S): at 20:02

## 2024-04-07 RX ADMIN — LAMOTRIGINE 100 MILLIGRAM(S): 25 TABLET, ORALLY DISINTEGRATING ORAL at 20:02

## 2024-04-07 RX ADMIN — Medication 81 MILLIGRAM(S): at 20:02

## 2024-04-07 RX ADMIN — ATORVASTATIN CALCIUM 80 MILLIGRAM(S): 80 TABLET, FILM COATED ORAL at 20:55

## 2024-04-07 RX ADMIN — VALSARTAN 80 MILLIGRAM(S): 80 TABLET ORAL at 20:02

## 2024-04-07 RX ADMIN — Medication 0.3 MILLIGRAM(S): at 21:05

## 2024-04-07 RX ADMIN — CYCLOBENZAPRINE HYDROCHLORIDE 10 MILLIGRAM(S): 10 TABLET, FILM COATED ORAL at 22:11

## 2024-04-07 RX ADMIN — Medication 5 MILLIGRAM(S): at 20:55

## 2024-04-07 NOTE — ED ADULT NURSE REASSESSMENT NOTE - NS ED NURSE REASSESS COMMENT FT1
VSS, pt in no distress. cardiac monitor in place. Obs ongoing. pt able to tolerate po intake. IVL intact. pt scheduled for CCTA & echo in AM. Will cont to monitor. pt has no complaints at this time

## 2024-04-07 NOTE — ED ADULT NURSE NOTE - NSFALLHARMRISKINTERV_ED_ALL_ED

## 2024-04-07 NOTE — ED ADULT NURSE REASSESSMENT NOTE - NS ED NURSE REASSESS COMMENT FT1
pt is sleeping, alert when spoken to, pt connect to tele/o2 monitor, vital stable, iv intact no s/s of distress. Propranolol Counseling:  I discussed with the patient the risks of propranolol including but not limited to low heart rate, low blood pressure, low blood sugar, restlessness and increased cold sensitivity. They should call the office if they experience any of these side effects.

## 2024-04-07 NOTE — ED CDU PROVIDER INITIAL DAY NOTE - PROGRESS NOTE DETAILS
patient signed out from salena conde, no complaint awaiting ccta and echo tomorrow will continue to monitor

## 2024-04-07 NOTE — ED PROVIDER NOTE - WET READ LAUNCH FT
[facility]    Brief Operative Note    Pre-operative diagnosis: Pelvic mass [R19.00], family history of ovarian cancer  Post-operative diagnosis Same as pre-operative diagnosis    Procedure: Procedure(s):  LAPAROSCOPIC BILATERAL SALPINGO-OOPHORECTOMY  Surgeon: Surgeon(s) and Role:     * Dianne Azar MD - Primary   Aris Flores MD PGY-3  Anesthesia: General   Estimated Blood Loss: 3cc    Drains: None  Specimens:   ID Type Source Tests Collected by Time Destination   1 : Right Tube and Ovary  Tissue Ovary and Fallopian Tube, Right SURGICAL PATHOLOGY EXAM Dianne Azar MD 3/16/2023  8:15 AM    2 : Pelvic Washings  Washings Pelvis NON-GYNECOLOGIC CYTOLOGY Dianne Azar MD 3/16/2023  8:20 AM    3 : Left Ovary and Fallopian Tube  Tissue Ovary and Fallopian Tube, Left SURGICAL PATHOLOGY EXAM Dianne Azar MD 3/16/2023  8:25 AM      Findings:   normal bimanual exam with small mobile uterus, no adnexal masses. atraumatic abdominal entry. adhesions of the omentum and bowel to the anterior abdominal wall, colon adherent to the R pelvic sidewall. no pelvic adhesions. normal appearing uterus, bilateral fallopian tubes, and L ovary. small firm nodule on the R ovary. both ureters vermiculated normally away from the surgical sites. .  Complications: None.  Implants: * No implants in log *      Aris Flores MD  OB/GYN PGY-3  03/16/2023 8:51 AM       There is 1 Wet Read(s) to document. There are no Wet Read(s) to document.

## 2024-04-07 NOTE — ED PROVIDER NOTE - CLINICAL SUMMARY MEDICAL DECISION MAKING FREE TEXT BOX
79yF HTN multiple autoimmune disorders on daily prednisone p/w chest pain x 3d in the setting of covid 6wk ago, put on NSAIDs and colchicine by cardiologist for suspected pericarditis.  Pt well appearing and hemodynamically stable.  EKG w/o ischemia or arrhythmia.  CXR w/o ptx or pna.  Labs reassuring, including normal electrolytes and low normal trop 13.  No sig clinical concern for PE despite daily estradiol use.  D/w pt and family and will place in obs for further management.

## 2024-04-07 NOTE — ED PROVIDER NOTE - NSICDXPASTSURGICALHX_GEN_ALL_CORE_FT
14-May-2020 09:16 PAST SURGICAL HISTORY:  Abscess, ovarian     H/O cholecystitis h/o lap neil    History of back surgery     History of kidney stones     History of total right hip replacement     S/P cataract surgery

## 2024-04-07 NOTE — ED CDU PROVIDER INITIAL DAY NOTE - CLINICAL SUMMARY MEDICAL DECISION MAKING FREE TEXT BOX
Patient presented with chest pain and dyspnea x 1 month as documented - initial work up in ED negative. Placed in obs for further ACS rule out. Plan is for serial trops, nuclear stress testing, echo. Patient NAD at this time, agreeable with plan. Will re-eval.

## 2024-04-07 NOTE — ED PROVIDER NOTE - OBJECTIVE STATEMENT
79-year-old female history of MS, rheumatoid arthritis, hypertension, tachycardia complaining of shortness of breath and chest pain x 1 month.  Symptoms have been intermittent and described as pressure.  This past week the pain started shooting to her left arm.  She saw her cardiologist Dr. Crouch for this and he has been treating her with colchicine and Aleve for possible pericarditis since she  had COVID 6 weeks ago.

## 2024-04-07 NOTE — ED PROVIDER NOTE - ATTENDING APP SHARED VISIT CONTRIBUTION OF CARE
79yF p/w chest pain - reports daily morning chest pain over the past 3+ days, intermittently chest pressure, sharp L upper arm pain and burning in her chest and throat.  Called her cardiologist (pt w/ hx tachycardia, sees Dr. Crouch, last stress test years ago) who recommended she come to the ED.  Pt is on daily steroids for multiple autoimmune disorders and cardiologist was worried about possible pericarditis and started her on aleve and colchicine.

## 2024-04-08 ENCOUNTER — RESULT REVIEW (OUTPATIENT)
Age: 79
End: 2024-04-08

## 2024-04-08 VITALS
DIASTOLIC BLOOD PRESSURE: 57 MMHG | RESPIRATION RATE: 18 BRPM | TEMPERATURE: 98 F | OXYGEN SATURATION: 98 % | HEART RATE: 61 BPM | SYSTOLIC BLOOD PRESSURE: 112 MMHG

## 2024-04-08 PROCEDURE — 99238 HOSP IP/OBS DSCHRG MGMT 30/<: CPT

## 2024-04-08 PROCEDURE — 93018 CV STRESS TEST I&R ONLY: CPT

## 2024-04-08 PROCEDURE — 93306 TTE W/DOPPLER COMPLETE: CPT | Mod: 26

## 2024-04-08 PROCEDURE — 78452 HT MUSCLE IMAGE SPECT MULT: CPT | Mod: 26,MC

## 2024-04-08 PROCEDURE — 93016 CV STRESS TEST SUPVJ ONLY: CPT

## 2024-04-08 RX ORDER — REGADENOSON 0.08 MG/ML
0.4 INJECTION, SOLUTION INTRAVENOUS ONCE
Refills: 0 | Status: DISCONTINUED | OUTPATIENT
Start: 2024-04-08 | End: 2024-04-08

## 2024-04-08 RX ORDER — METOPROLOL TARTRATE 50 MG
75 TABLET ORAL ONCE
Refills: 0 | Status: COMPLETED | OUTPATIENT
Start: 2024-04-08 | End: 2024-04-08

## 2024-04-08 RX ADMIN — Medication 5 MILLIGRAM(S): at 05:26

## 2024-04-08 RX ADMIN — Medication 25 MILLIGRAM(S): at 05:26

## 2024-04-08 RX ADMIN — Medication 75 MILLIGRAM(S): at 06:21

## 2024-04-08 RX ADMIN — PANTOPRAZOLE SODIUM 40 MILLIGRAM(S): 20 TABLET, DELAYED RELEASE ORAL at 05:49

## 2024-04-08 RX ADMIN — Medication 180 MILLIGRAM(S): at 05:26

## 2024-04-08 RX ADMIN — VALSARTAN 80 MILLIGRAM(S): 80 TABLET ORAL at 05:26

## 2024-04-08 RX ADMIN — LAMOTRIGINE 100 MILLIGRAM(S): 25 TABLET, ORALLY DISINTEGRATING ORAL at 05:26

## 2024-04-08 NOTE — ED ADULT NURSE REASSESSMENT NOTE - NS ED NURSE REASSESS COMMENT FT1
Patient reassessed. A&O x4. Patient placed on OBS, remains on cardiac monitor. Patient pending CTA/ECHO. IVL in place. Denies pain/discomfort. Safety & comfort measures maintained. Plan of care on going.

## 2024-04-08 NOTE — ED CDU PROVIDER SUBSEQUENT DAY NOTE - DIFFERENTIAL DIAGNOSIS
ACS, CHF exac, ptx, pneumomediastinum, pneumonia, pericarditis, myocarditis, pleural effusion. Differential Diagnosis

## 2024-04-08 NOTE — ED CDU PROVIDER SUBSEQUENT DAY NOTE - CLINICAL SUMMARY MEDICAL DECISION MAKING FREE TEXT BOX
Pt here with cp after recent covid infection, presumed pericarditis s/p tx. Labs wnl including negative trop x2. Ekg x2 with NSR, no ischemic ST/T changes. Cxr clear. Pending echo and stress test.

## 2024-04-08 NOTE — ED CDU PROVIDER DISPOSITION NOTE - ATTENDING CONTRIBUTION TO CARE
I have personally seen and examined this patient. I have fully participated in the care of this patient. I have made amendments to the documentation where appropriate and otherwise agree with the history, physical exam, and plan as documented by the TRINO.

## 2024-04-08 NOTE — ED CDU PROVIDER DISPOSITION NOTE - PATIENT PORTAL LINK FT
You can access the FollowMyHealth Patient Portal offered by [facility] by registering at the following website: [email or web address]. By joining [employer]’s FollowMyHealth portal, you will also be able to view your health information using other applications (apps) compatible with our system.

## 2024-04-08 NOTE — ED CDU PROVIDER DISPOSITION NOTE - CLINICAL COURSE
Pt here with cp after recent covid infection, presumed pericarditis s/p tx. Labs wnl including negative trop x2. Ekg x2 with NSR, no ischemic ST/T changes. Cxr clear. Echo with EF 60-65%, G1DD. Stress test with no ischemic defect, EF 77%. No active cp, stable for d/c home with f/u with cardiology Dr. Flores outpatient. Strict ED return precautions given. Pt verbalized understanding and was agreeable with plan.

## 2024-04-08 NOTE — ED CDU PROVIDER SUBSEQUENT DAY NOTE - ATTENDING APP SHARED VISIT CONTRIBUTION OF CARE
I have personally seen and examined this patient. I have fully participated in the care of this patient. I have made amendments to the documentation where appropriate and otherwise agree with the history, physical exam, and plan as documented by the TRINO.    See MDM

## 2024-04-08 NOTE — ED CDU PROVIDER SUBSEQUENT DAY NOTE - PROGRESS NOTE DETAILS
Patient at echo. Patient returned from echo, resting comfortably. Patients HR elevated and imaging changed to nuclear stress test. Patient transported to nuclear stress test.

## 2024-05-01 ENCOUNTER — EMERGENCY (EMERGENCY)
Facility: HOSPITAL | Age: 79
LOS: 0 days | Discharge: HOME | End: 2024-05-01
Attending: EMERGENCY MEDICINE
Payer: MEDICARE

## 2024-05-01 VITALS
TEMPERATURE: 99 F | SYSTOLIC BLOOD PRESSURE: 157 MMHG | HEART RATE: 96 BPM | DIASTOLIC BLOOD PRESSURE: 70 MMHG | RESPIRATION RATE: 20 BRPM | WEIGHT: 143.08 LBS | OXYGEN SATURATION: 99 %
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
## 2024-05-01 DIAGNOSIS — Z98.49 CATARACT EXTRACTION STATUS, UNSPECIFIED EYE: Chronic | ICD-10-CM

## 2024-05-01 DIAGNOSIS — M06.9 RHEUMATOID ARTHRITIS, UNSPECIFIED: ICD-10-CM

## 2024-05-01 DIAGNOSIS — G35 MULTIPLE SCLEROSIS: ICD-10-CM

## 2024-05-01 DIAGNOSIS — S61.212A LACERATION WITHOUT FOREIGN BODY OF RIGHT MIDDLE FINGER WITHOUT DAMAGE TO NAIL, INITIAL ENCOUNTER: ICD-10-CM

## 2024-05-01 DIAGNOSIS — Z88.0 ALLERGY STATUS TO PENICILLIN: ICD-10-CM

## 2024-05-01 DIAGNOSIS — F32.A DEPRESSION, UNSPECIFIED: ICD-10-CM

## 2024-05-01 DIAGNOSIS — N70.92 OOPHORITIS, UNSPECIFIED: Chronic | ICD-10-CM

## 2024-05-01 DIAGNOSIS — Z23 ENCOUNTER FOR IMMUNIZATION: ICD-10-CM

## 2024-05-01 DIAGNOSIS — Z98.890 OTHER SPECIFIED POSTPROCEDURAL STATES: Chronic | ICD-10-CM
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
## 2024-05-01 DIAGNOSIS — Z96.641 PRESENCE OF RIGHT ARTIFICIAL HIP JOINT: Chronic | ICD-10-CM

## 2024-05-01 DIAGNOSIS — Z87.19 PERSONAL HISTORY OF OTHER DISEASES OF THE DIGESTIVE SYSTEM: Chronic | ICD-10-CM

## 2024-05-01 DIAGNOSIS — I10 ESSENTIAL (PRIMARY) HYPERTENSION: ICD-10-CM

## 2024-05-01 DIAGNOSIS — Z88.2 ALLERGY STATUS TO SULFONAMIDES: ICD-10-CM

## 2024-05-01 DIAGNOSIS — Z87.442 PERSONAL HISTORY OF URINARY CALCULI: Chronic | ICD-10-CM

## 2024-05-01 DIAGNOSIS — K21.9 GASTRO-ESOPHAGEAL REFLUX DISEASE WITHOUT ESOPHAGITIS: ICD-10-CM

## 2024-05-01 DIAGNOSIS — S61.210A LACERATION WITHOUT FOREIGN BODY OF RIGHT INDEX FINGER WITHOUT DAMAGE TO NAIL, INITIAL ENCOUNTER: ICD-10-CM

## 2024-05-01 PROCEDURE — 90471 IMMUNIZATION ADMIN: CPT

## 2024-05-01 PROCEDURE — 99283 EMERGENCY DEPT VISIT LOW MDM: CPT | Mod: 25

## 2024-05-01 PROCEDURE — 90715 TDAP VACCINE 7 YRS/> IM: CPT

## 2024-05-01 PROCEDURE — 12002 RPR S/N/AX/GEN/TRNK2.6-7.5CM: CPT

## 2024-05-01 PROCEDURE — 99284 EMERGENCY DEPT VISIT MOD MDM: CPT | Mod: FS,25

## 2024-05-01 RX ORDER — TETANUS TOXOID, REDUCED DIPHTHERIA TOXOID AND ACELLULAR PERTUSSIS VACCINE, ADSORBED 5; 2.5; 8; 8; 2.5 [IU]/.5ML; [IU]/.5ML; UG/.5ML; UG/.5ML; UG/.5ML
0.5 SUSPENSION INTRAMUSCULAR ONCE
Refills: 0 | Status: COMPLETED | OUTPATIENT
Start: 2024-05-01 | End: 2024-05-01

## 2024-05-01 RX ORDER — IBUPROFEN 200 MG
600 TABLET ORAL ONCE
Refills: 0 | Status: COMPLETED | OUTPATIENT
Start: 2024-05-01 | End: 2024-05-01

## 2024-05-01 RX ADMIN — TETANUS TOXOID, REDUCED DIPHTHERIA TOXOID AND ACELLULAR PERTUSSIS VACCINE, ADSORBED 0.5 MILLILITER(S): 5; 2.5; 8; 8; 2.5 SUSPENSION INTRAMUSCULAR at 21:55

## 2024-05-01 RX ADMIN — Medication 300 MILLIGRAM(S): at 21:54

## 2024-05-01 NOTE — ED ADULT TRIAGE NOTE - TEMPERATURE IN CELSIUS (DEGREES C)


   - Escalera 














FLORIN CAMPBELL MD Nov 7, 2018 08:38 37.1

## 2024-05-01 NOTE — ED PROVIDER NOTE - PHYSICAL EXAMINATION
VITAL SIGNS: I have reviewed nursing notes and confirm.  CONSTITUTIONAL: 80 yo F laying on stretcher; in no acute distress.  SKIN: Skin exam is warm and dry, no acute rash.  HEAD: Normocephalic; atraumatic.  EYES: Conjunctiva and sclera clear.  ENT: No nasal discharge; airway clear.   CARD: Regular rate and rhythm.  RESP: Speaking in full sentences.   EXT: Normal ROM. (+) 1.5 cm superficial laceration to palmar aspect of R 2nd finger PIP region without FB or active bleeding. (+) 1.5 cm superficial laceration to palmar aspect of R 3rd finger PIP region without FB or active bleeding. (+) 2 cm superficial laceration to dorsal aspect of R 3rd finger just below MCP without FB or active bleeding. FROM. Sensation intact. CR < 2 seconds.   NEURO: Alert, oriented. Grossly unremarkable. No focal deficits.

## 2024-05-01 NOTE — ED PROVIDER NOTE - NSFOLLOWUPINSTRUCTIONS_ED_ALL_ED_FT
PLEASE FOLLOW-UP IN 10-14 DAYS FOR SUTURE REMOVAL.     Laceration    A laceration is a cut that goes through all of the layers of the skin and into the tissue that is right under the skin. Some lacerations heal on their own. Others need to be closed with skin adhesive strips, skin glue, stitches (sutures), or staples. Proper laceration care minimizes the risk of infection and helps the laceration to heal better.  If non-absorbable stitches or staples have been placed, they must be taken out within the time frame instructed by your healthcare provider.    SEEK IMMEDIATE MEDICAL CARE IF YOU HAVE ANY OF THE FOLLOWING SYMPTOMS: swelling around the wound, worsening pain, drainage from the wound, red streaking going away from your wound, inability to move finger or toe near the laceration, or discoloration of skin near the laceration.

## 2024-05-01 NOTE — ED PROVIDER NOTE - OBJECTIVE STATEMENT
79-year-old female with past medical history of cataracts, depression, gastritis, migraines, GERD, factor V Leiden, HTN, MS and rheumatoid arthritis presents to the ED complaining of multiple lacerations to her right hand.  Patient was outside using her grill and the wind blew the cover down which accidentally closed on her hand.  She noted lacerations and applied dressing immediately.  She is unsure of tetanus status.  She denies other complaints.  No fever, chills, numbness, weakness.

## 2024-05-01 NOTE — ED ADULT NURSE NOTE - NSICDXFAMILYHX_GEN_ALL_CORE_FT
FAMILY HISTORY:  Father  Still living? Unknown  Family history of lung cancer, Age at diagnosis: Age Unknown    Sibling  Still living? Unknown  Family history of CLL (chronic lymphoid leukemia), Age at diagnosis: Age Unknown    Aunt  Still living? Unknown  Family history of uterine cancer, Age at diagnosis: Age Unknown

## 2024-05-01 NOTE — ED PROVIDER NOTE - PATIENT PORTAL LINK FT
You can access the FollowMyHealth Patient Portal offered by [facility] by registering at the following website: [email or web address]. By joining [employer]’s FollowMyHealth portal, you will also be able to view your health information using other applications (apps) compatible with our system.

## 2024-05-02 NOTE — ED PROCEDURE NOTE - CPROC ED TIME OUT STATEMENT1
“Patient's name, , procedure and correct site were confirmed during the [place] Timeout.”
“Patient's name, , procedure and correct site were confirmed during the [place] Timeout.”
“Patient's name, , procedure and correct site were confirmed during the [place] Timeout.”

## 2024-05-02 NOTE — ED PROCEDURE NOTE - NS ED ATTENDING STATEMENT MOD
This was a shared visit with the TRINO. I reviewed and verified the documentation.

## 2024-05-26 ENCOUNTER — NON-APPOINTMENT (OUTPATIENT)
Age: 79
End: 2024-05-26

## 2024-06-19 ENCOUNTER — EMERGENCY (EMERGENCY)
Facility: HOSPITAL | Age: 79
LOS: 0 days | Discharge: HOME | End: 2024-06-20
Attending: EMERGENCY MEDICINE
Payer: MEDICARE

## 2024-06-19 VITALS
HEART RATE: 81 BPM | WEIGHT: 139.99 LBS | OXYGEN SATURATION: 99 % | HEIGHT: 61 IN | DIASTOLIC BLOOD PRESSURE: 64 MMHG | SYSTOLIC BLOOD PRESSURE: 136 MMHG | TEMPERATURE: 97 F | RESPIRATION RATE: 18 BRPM

## 2024-06-19 DIAGNOSIS — R10.30 LOWER ABDOMINAL PAIN, UNSPECIFIED: ICD-10-CM

## 2024-06-19 DIAGNOSIS — Z98.890 OTHER SPECIFIED POSTPROCEDURAL STATES: Chronic | ICD-10-CM

## 2024-06-19 DIAGNOSIS — Z88.0 ALLERGY STATUS TO PENICILLIN: ICD-10-CM

## 2024-06-19 DIAGNOSIS — Z87.442 PERSONAL HISTORY OF URINARY CALCULI: Chronic | ICD-10-CM

## 2024-06-19 DIAGNOSIS — N70.92 OOPHORITIS, UNSPECIFIED: Chronic | ICD-10-CM

## 2024-06-19 DIAGNOSIS — Z96.641 PRESENCE OF RIGHT ARTIFICIAL HIP JOINT: Chronic | ICD-10-CM

## 2024-06-19 DIAGNOSIS — Z88.2 ALLERGY STATUS TO SULFONAMIDES: ICD-10-CM

## 2024-06-19 DIAGNOSIS — Z98.49 CATARACT EXTRACTION STATUS, UNSPECIFIED EYE: Chronic | ICD-10-CM

## 2024-06-19 DIAGNOSIS — Z87.19 PERSONAL HISTORY OF OTHER DISEASES OF THE DIGESTIVE SYSTEM: Chronic | ICD-10-CM

## 2024-06-19 LAB
ALBUMIN SERPL ELPH-MCNC: 4.6 G/DL — SIGNIFICANT CHANGE UP (ref 3.5–5.2)
ALP SERPL-CCNC: 59 U/L — SIGNIFICANT CHANGE UP (ref 30–115)
ALT FLD-CCNC: 49 U/L — HIGH (ref 0–41)
ANION GAP SERPL CALC-SCNC: 12 MMOL/L — SIGNIFICANT CHANGE UP (ref 7–14)
APPEARANCE UR: CLEAR — SIGNIFICANT CHANGE UP
AST SERPL-CCNC: 38 U/L — SIGNIFICANT CHANGE UP (ref 0–41)
BASOPHILS # BLD AUTO: 0.03 K/UL — SIGNIFICANT CHANGE UP (ref 0–0.2)
BASOPHILS NFR BLD AUTO: 0.3 % — SIGNIFICANT CHANGE UP (ref 0–1)
BILIRUB SERPL-MCNC: 0.7 MG/DL — SIGNIFICANT CHANGE UP (ref 0.2–1.2)
BILIRUB UR-MCNC: NEGATIVE — SIGNIFICANT CHANGE UP
BUN SERPL-MCNC: 21 MG/DL — HIGH (ref 10–20)
CALCIUM SERPL-MCNC: 9.2 MG/DL — SIGNIFICANT CHANGE UP (ref 8.4–10.5)
CHLORIDE SERPL-SCNC: 94 MMOL/L — LOW (ref 98–110)
CO2 SERPL-SCNC: 28 MMOL/L — SIGNIFICANT CHANGE UP (ref 17–32)
COLOR SPEC: YELLOW — SIGNIFICANT CHANGE UP
CREAT SERPL-MCNC: 0.8 MG/DL — SIGNIFICANT CHANGE UP (ref 0.7–1.5)
DIFF PNL FLD: NEGATIVE — SIGNIFICANT CHANGE UP
EGFR: 74 ML/MIN/1.73M2 — SIGNIFICANT CHANGE UP
EOSINOPHIL # BLD AUTO: 0.02 K/UL — SIGNIFICANT CHANGE UP (ref 0–0.7)
EOSINOPHIL NFR BLD AUTO: 0.2 % — SIGNIFICANT CHANGE UP (ref 0–8)
GLUCOSE SERPL-MCNC: 132 MG/DL — HIGH (ref 70–99)
GLUCOSE UR QL: NEGATIVE MG/DL — SIGNIFICANT CHANGE UP
HCT VFR BLD CALC: 31.9 % — LOW (ref 37–47)
HGB BLD-MCNC: 11.3 G/DL — LOW (ref 12–16)
IMM GRANULOCYTES NFR BLD AUTO: 0.8 % — HIGH (ref 0.1–0.3)
KETONES UR-MCNC: NEGATIVE MG/DL — SIGNIFICANT CHANGE UP
LACTATE SERPL-SCNC: 1.6 MMOL/L — SIGNIFICANT CHANGE UP (ref 0.7–2)
LEUKOCYTE ESTERASE UR-ACNC: NEGATIVE — SIGNIFICANT CHANGE UP
LIDOCAIN IGE QN: 7 U/L — SIGNIFICANT CHANGE UP (ref 7–60)
LYMPHOCYTES # BLD AUTO: 1.44 K/UL — SIGNIFICANT CHANGE UP (ref 1.2–3.4)
LYMPHOCYTES # BLD AUTO: 14 % — LOW (ref 20.5–51.1)
MCHC RBC-ENTMCNC: 32.3 PG — HIGH (ref 27–31)
MCHC RBC-ENTMCNC: 35.4 G/DL — SIGNIFICANT CHANGE UP (ref 32–37)
MCV RBC AUTO: 91.1 FL — SIGNIFICANT CHANGE UP (ref 81–99)
MONOCYTES # BLD AUTO: 0.87 K/UL — HIGH (ref 0.1–0.6)
MONOCYTES NFR BLD AUTO: 8.5 % — SIGNIFICANT CHANGE UP (ref 1.7–9.3)
NEUTROPHILS # BLD AUTO: 7.81 K/UL — HIGH (ref 1.4–6.5)
NEUTROPHILS NFR BLD AUTO: 76.2 % — HIGH (ref 42.2–75.2)
NITRITE UR-MCNC: NEGATIVE — SIGNIFICANT CHANGE UP
NRBC # BLD: 0 /100 WBCS — SIGNIFICANT CHANGE UP (ref 0–0)
PH UR: 7 — SIGNIFICANT CHANGE UP (ref 5–8)
PLATELET # BLD AUTO: 335 K/UL — SIGNIFICANT CHANGE UP (ref 130–400)
PMV BLD: 8.6 FL — SIGNIFICANT CHANGE UP (ref 7.4–10.4)
POTASSIUM SERPL-MCNC: 4.2 MMOL/L — SIGNIFICANT CHANGE UP (ref 3.5–5)
POTASSIUM SERPL-SCNC: 4.2 MMOL/L — SIGNIFICANT CHANGE UP (ref 3.5–5)
PROT SERPL-MCNC: 7.3 G/DL — SIGNIFICANT CHANGE UP (ref 6–8)
PROT UR-MCNC: NEGATIVE MG/DL — SIGNIFICANT CHANGE UP
RBC # BLD: 3.5 M/UL — LOW (ref 4.2–5.4)
RBC # FLD: 13.2 % — SIGNIFICANT CHANGE UP (ref 11.5–14.5)
SODIUM SERPL-SCNC: 134 MMOL/L — LOW (ref 135–146)
SP GR SPEC: 1.02 — SIGNIFICANT CHANGE UP (ref 1–1.03)
UROBILINOGEN FLD QL: 0.2 MG/DL — SIGNIFICANT CHANGE UP (ref 0.2–1)
WBC # BLD: 10.25 K/UL — SIGNIFICANT CHANGE UP (ref 4.8–10.8)
WBC # FLD AUTO: 10.25 K/UL — SIGNIFICANT CHANGE UP (ref 4.8–10.8)

## 2024-06-19 PROCEDURE — 81003 URINALYSIS AUTO W/O SCOPE: CPT

## 2024-06-19 PROCEDURE — 83605 ASSAY OF LACTIC ACID: CPT

## 2024-06-19 PROCEDURE — 85025 COMPLETE CBC W/AUTO DIFF WBC: CPT

## 2024-06-19 PROCEDURE — 74177 CT ABD & PELVIS W/CONTRAST: CPT | Mod: MC

## 2024-06-19 PROCEDURE — 36415 COLL VENOUS BLD VENIPUNCTURE: CPT

## 2024-06-19 PROCEDURE — 83690 ASSAY OF LIPASE: CPT

## 2024-06-19 PROCEDURE — 99284 EMERGENCY DEPT VISIT MOD MDM: CPT | Mod: 25

## 2024-06-19 PROCEDURE — 99285 EMERGENCY DEPT VISIT HI MDM: CPT | Mod: FS

## 2024-06-19 PROCEDURE — 80053 COMPREHEN METABOLIC PANEL: CPT

## 2024-06-19 RX ORDER — SODIUM CHLORIDE 9 MG/ML
1000 INJECTION, SOLUTION INTRAVENOUS ONCE
Refills: 0 | Status: COMPLETED | OUTPATIENT
Start: 2024-06-19 | End: 2024-06-19

## 2024-06-19 RX ADMIN — SODIUM CHLORIDE 1000 MILLILITER(S): 9 INJECTION, SOLUTION INTRAVENOUS at 22:49

## 2024-06-19 NOTE — ED PROVIDER NOTE - CARE PROVIDER_API CALL
your PMD,   Phone: (   )    -  Fax: (   )    -  Follow Up Time:     your GI,   Phone: (   )    -  Fax: (   )    -  Follow Up Time:

## 2024-06-19 NOTE — ED ADULT NURSE NOTE - NSICDXFAMILYHX_GEN_ALL_CORE_FT
FAMILY HISTORY:  Father  Still living? Unknown  Family history of lung cancer, Age at diagnosis: Age Unknown    Sibling  Still living? Unknown  Family history of CLL (chronic lymphoid leukemia), Age at diagnosis: Age Unknown    Aunt  Still living? Unknown  Family history of uterine cancer, Age at diagnosis: Age Unknown     stated

## 2024-06-19 NOTE — ED PROVIDER NOTE - CLINICAL SUMMARY MEDICAL DECISION MAKING FREE TEXT BOX
Patient with lower abdominal pain, no fever, just finished antibiotics for UTI, was started on lactulose yesterday, on exam with suprapubic tenderness, labs and studies appreciated discussed with patient and family, will discharge to continue bowel regimen and follow-up with GI.  Patient counseled regarding conditions which should prompt return.

## 2024-06-19 NOTE — ED PROVIDER NOTE - NSFOLLOWUPINSTRUCTIONS_ED_ALL_ED_FT
Abdominal Pain    Many things can cause abdominal pain. Many times, abdominal pain is not caused by a disease and will improve without treatment. Your health care provider will do a physical exam to determine if there is a dangerous cause of your pain; blood tests and imaging may help determine the cause of your pain. However, in many cases, no cause may be found and you may need further testing as an outpatient. Monitor your abdominal pain for any changes.     SEEK IMMEDIATE MEDICAL CARE IF YOU HAVE ANY OF THE FOLLOWING SYMPTOMS: worsening abdominal pain, uncontrollable vomiting, profuse diarrhea, inability to have bowel movements or pass gas, black or bloody stools, fever accompanying chest pain or back pain, or fainting. These symptoms may represent a serious problem that is an emergency. Do not wait to see if the symptoms will go away. Get medical help right away. Call 911 and do not drive yourself to the hospital.  Constipation    Constipation is when a person has fewer than three bowel movements a week, has difficulty having a bowel movement, or has stools that are dry, hard, or larger than normal. Other symptoms can include abdominal pain or bloating. As people grow older, constipation is more common. A low-fiber diet, not taking in enough fluids, and taking certain medicines, including opioid painkillers, may make constipation worse. Treatment varies but may include dietary modifications (more fiber-rich foods), lifestyle modifications, and possible medications.     SEEK IMMEDIATE MEDICAL CARE IF YOU HAVE ANY OF THE FOLLOWING SYMPTOMS: bright red blood in your stool, constipation for longer than 4 days, abdominal or rectal pain, unexplained weight loss, or inability to pass gas.

## 2024-06-19 NOTE — ED PROVIDER NOTE - PATIENT PORTAL LINK FT
You can access the FollowMyHealth Patient Portal offered by [facility] by registering at the following website: [email or web address]. By joining [employer]’s FollowMyHealth portal, you will also be able to view your health information using other applications (apps) compatible with our system.

## 2024-06-19 NOTE — ED PROVIDER NOTE - OBJECTIVE STATEMENT
patient c/o lower abd pain since last night, no N/V, no diarrhea, last BM 2 days ago, Just finished cipro for UTI

## 2024-06-19 NOTE — ED PROVIDER NOTE - ATTENDING APP SHARED VISIT CONTRIBUTION OF CARE
I personally evaluated the patient. I reviewed the Resident´s or Physician Assistant´s note (as assigned above), and agree with the findings and plan except as documented in my note.    79-year-old female presents to the ED for lower abdominal pain, central, crampy, not associated with diarrhea or vomiting.  Recent initiation of antibiotics for UTI after outpatient visit by her PMD.  No other complaints.    GENERAL: female in no distress.   HEENT: EOMI   CHEST: normal work of breathing noted  CV: pulses intact   EXTR: FROM   NEURO: AAO 3 no focal deficits  SKIN: normal no pallor   PSYCH: normal mood & mentation    Impression: Abdominal pain    Plan: IV, labs, imaging, supportive care & reevaluation

## 2024-06-19 NOTE — ED PROVIDER NOTE - PROVIDER TOKENS
FREE:[LAST:[your PMD],PHONE:[(   )    -],FAX:[(   )    -]],FREE:[LAST:[your GI],PHONE:[(   )    -],FAX:[(   )    -]]

## 2024-06-20 VITALS
DIASTOLIC BLOOD PRESSURE: 68 MMHG | OXYGEN SATURATION: 99 % | RESPIRATION RATE: 18 BRPM | SYSTOLIC BLOOD PRESSURE: 120 MMHG | HEART RATE: 74 BPM

## 2024-06-20 PROCEDURE — 74177 CT ABD & PELVIS W/CONTRAST: CPT | Mod: 26,MC

## 2024-07-23 ENCOUNTER — APPOINTMENT (OUTPATIENT)
Dept: SURGERY | Facility: CLINIC | Age: 79
End: 2024-07-23
Payer: MEDICARE

## 2024-07-23 VITALS — BODY MASS INDEX: 26.81 KG/M2 | HEIGHT: 61 IN | WEIGHT: 142 LBS

## 2024-07-23 DIAGNOSIS — K40.90 UNILATERAL INGUINAL HERNIA, W/OUT OBSTRUCTION OR GANGRENE, NOT SPECIFIED AS RECURRENT: ICD-10-CM

## 2024-07-23 PROCEDURE — 99214 OFFICE O/P EST MOD 30 MIN: CPT

## 2024-07-23 NOTE — PHYSICAL EXAM
[JVD] : no jugular venous distention  [Normal Breath Sounds] : Normal breath sounds [No Rash or Lesion] : No rash or lesion [Alert] : alert [Calm] : calm [de-identified] : Healthy [de-identified] : Normal [de-identified] : Moderately protuberant abdomen [de-identified] : Right inguinal hernia, reducible

## 2024-07-23 NOTE — CONSULT LETTER
[Dear  ___] : Dear  [unfilled], [Courtesy Letter:] : I had the pleasure of seeing your patient, [unfilled], in my office today. [Please see my note below.] : Please see my note below. [Consult Closing:] : Thank you very much for allowing me to participate in the care of this patient.  If you have any questions, please do not hesitate to contact me. [FreeTextEntry3] : Respectfully,  Caesar Sharp M.D., FACS [DrBraden  ___] : Dr. PETTIT [DrBraden ___] : Dr. PETTIT

## 2024-07-23 NOTE — ASSESSMENT
[FreeTextEntry1] : Teresita is a pleasant 79-year-old  with a past medical history significant for hypertension, hypercholesterolemia, sleep apnea, arthritis, GERD, spinal stenosis, chronic constipation, fatty liver, factor V Leyden mutation on baby aspirin and multiple surgeries in the past including a right hip replacement currently under recall requiring re-operative surgery and chronic back pain on tramadol (and considering a laminectomy) with intermittent pain and discomfort in the right groin seen by her gynecologist who suspected a right inguinal hernia prompting surgical referral and evaluation today.  Physical examination demonstrates a moderate to large very tender easily reducible right inguinal hernia warranting surgical repair.  There is no evidence of incarceration or strangulation, and the patient denies any symptoms of obstruction.  Examination of her left groin demonstrates a significant weakness but no obvious hernia.  Her umbilical examination is unremarkable.  Her current BMI is 27.  I was able to review both the report and images from her recent CT scan of the abdomen and pelvis done in the emergency room at [facility] after severe abdominal pain prompted this visit.  She did have copious amounts of stool and there is evidence of a right inguinal hernia and possibly an early left inguinal hernia on these images although not mentioned in the radiology report.  I explained the pros and cons of surgery, as well as all risks, benefits, indications and alternatives of the procedure and the patient understood and agreed.  She will talk this over with her cardiologist as she had a recent echocardiogram as a follow-up due to significant post-viral pericarditis.  She will call us back when she is ready to schedule.  A complicating factor is that she has a trip planned to [place] on September 9.  Deloris is aware that the repair of her right inguinal hernia with mesh will be done under LOCAL with IV SEDATION at [facility] with presurgical testing preceding this date. She was encouraged to avoid heavy lifting and strenuous activity in the interim, of course.  In light of her factor V Leyden mutation, she will remain on her baby aspirin and in addition to avoiding general anesthesia and using intraoperative sequential stockings, she will be given 5000 units of subcutaneous heparin in the preoperative area to further reduce the risk of perioperative VTE.  A total of 45 minutes was spent on this encounter.

## 2024-07-31 ENCOUNTER — APPOINTMENT (OUTPATIENT)
Dept: ORTHOPEDIC SURGERY | Facility: CLINIC | Age: 79
End: 2024-07-31
Payer: MEDICARE

## 2024-07-31 VITALS — HEIGHT: 61 IN | BODY MASS INDEX: 27 KG/M2 | WEIGHT: 143 LBS

## 2024-07-31 DIAGNOSIS — M25.572 PAIN IN LEFT ANKLE AND JOINTS OF LEFT FOOT: ICD-10-CM

## 2024-07-31 DIAGNOSIS — M79.672 PAIN IN LEFT FOOT: ICD-10-CM

## 2024-07-31 DIAGNOSIS — M25.571 PAIN IN RIGHT ANKLE AND JOINTS OF RIGHT FOOT: ICD-10-CM

## 2024-07-31 PROCEDURE — 73610 X-RAY EXAM OF ANKLE: CPT | Mod: LT

## 2024-07-31 PROCEDURE — 73630 X-RAY EXAM OF FOOT: CPT | Mod: LT

## 2024-07-31 PROCEDURE — 99203 OFFICE O/P NEW LOW 30 MIN: CPT

## 2024-07-31 NOTE — HISTORY OF PRESENT ILLNESS
[de-identified] :  79-year-old female here for evaluation of pain of left foot and ankle pain, patient stated this pain is started about a week ago, states that at times she is unable to walk due to the pain. Patient also has a significant past medical history of discogenic disease, patient had surgery and lately she been having significant pain radiating from the lumbar spine to her leg. Patient is states that prior to her discectomy, she could not move her left lower extremity, at this time she is able to ambulate but she has weakness to her left leg. Patient also gives a history of rheumatoid arthritis.

## 2024-07-31 NOTE — IMAGING
[de-identified] : On examination of the left foot and ankle, patient has mild generalized swelling, no ecchymosis, no erythema noted. Tenderness over the peroneal tendon, some tender when palpating the anterior aspect of the tibia. Significant decreased range of motion to the left lower extremity. Decree sensation to light touch. Mild antalgic gait, ambulating with a cane. Calf soft, nontender. No signs of instability.  Radiographs of the left foot and left ankle were done in office today, 3 views were taken, negative for any acute fracture or dislocation

## 2024-07-31 NOTE — DISCUSSION/SUMMARY
[de-identified] : Impression: Left foot and ankle pain  Plan: Patient was advised for an MRI to rule out any musculoskeletal injury versus inflammatory arthropathy  Follow-up: Patient was given a call to go over the results.

## 2024-08-01 ENCOUNTER — RESULT REVIEW (OUTPATIENT)
Age: 79
End: 2024-08-01

## 2024-08-01 ENCOUNTER — NON-APPOINTMENT (OUTPATIENT)
Age: 79
End: 2024-08-01

## 2024-08-01 ENCOUNTER — OUTPATIENT (OUTPATIENT)
Dept: OUTPATIENT SERVICES | Facility: HOSPITAL | Age: 79
LOS: 1 days | End: 2024-08-01
Payer: MEDICARE

## 2024-08-01 DIAGNOSIS — M79.672 PAIN IN LEFT FOOT: ICD-10-CM

## 2024-08-01 DIAGNOSIS — Z98.49 CATARACT EXTRACTION STATUS, UNSPECIFIED EYE: Chronic | ICD-10-CM

## 2024-08-01 DIAGNOSIS — Z00.8 ENCOUNTER FOR OTHER GENERAL EXAMINATION: ICD-10-CM

## 2024-08-01 DIAGNOSIS — N70.92 OOPHORITIS, UNSPECIFIED: Chronic | ICD-10-CM

## 2024-08-01 DIAGNOSIS — Z98.890 OTHER SPECIFIED POSTPROCEDURAL STATES: Chronic | ICD-10-CM

## 2024-08-01 DIAGNOSIS — Z96.641 PRESENCE OF RIGHT ARTIFICIAL HIP JOINT: Chronic | ICD-10-CM

## 2024-08-01 DIAGNOSIS — Z87.442 PERSONAL HISTORY OF URINARY CALCULI: Chronic | ICD-10-CM

## 2024-08-01 DIAGNOSIS — Z87.19 PERSONAL HISTORY OF OTHER DISEASES OF THE DIGESTIVE SYSTEM: Chronic | ICD-10-CM

## 2024-08-01 PROCEDURE — 73718 MRI LOWER EXTREMITY W/O DYE: CPT | Mod: 26,LT,MH

## 2024-08-01 PROCEDURE — 73718 MRI LOWER EXTREMITY W/O DYE: CPT | Mod: LT

## 2024-08-02 DIAGNOSIS — M79.672 PAIN IN LEFT FOOT: ICD-10-CM

## 2024-08-07 ENCOUNTER — OUTPATIENT (OUTPATIENT)
Dept: OUTPATIENT SERVICES | Facility: HOSPITAL | Age: 79
LOS: 1 days | End: 2024-08-07
Payer: MEDICARE

## 2024-08-07 VITALS
RESPIRATION RATE: 18 BRPM | DIASTOLIC BLOOD PRESSURE: 71 MMHG | WEIGHT: 141.98 LBS | HEART RATE: 80 BPM | TEMPERATURE: 98 F | SYSTOLIC BLOOD PRESSURE: 138 MMHG | HEIGHT: 61 IN | OXYGEN SATURATION: 99 %

## 2024-08-07 DIAGNOSIS — Z01.818 ENCOUNTER FOR OTHER PREPROCEDURAL EXAMINATION: ICD-10-CM

## 2024-08-07 DIAGNOSIS — Z98.49 CATARACT EXTRACTION STATUS, UNSPECIFIED EYE: Chronic | ICD-10-CM

## 2024-08-07 DIAGNOSIS — K40.90 UNILATERAL INGUINAL HERNIA, WITHOUT OBSTRUCTION OR GANGRENE, NOT SPECIFIED AS RECURRENT: ICD-10-CM

## 2024-08-07 DIAGNOSIS — Z96.641 PRESENCE OF RIGHT ARTIFICIAL HIP JOINT: Chronic | ICD-10-CM

## 2024-08-07 DIAGNOSIS — Z87.442 PERSONAL HISTORY OF URINARY CALCULI: Chronic | ICD-10-CM

## 2024-08-07 DIAGNOSIS — N70.92 OOPHORITIS, UNSPECIFIED: Chronic | ICD-10-CM

## 2024-08-07 DIAGNOSIS — Z98.890 OTHER SPECIFIED POSTPROCEDURAL STATES: Chronic | ICD-10-CM

## 2024-08-07 DIAGNOSIS — Z87.19 PERSONAL HISTORY OF OTHER DISEASES OF THE DIGESTIVE SYSTEM: Chronic | ICD-10-CM

## 2024-08-07 LAB
ALBUMIN SERPL ELPH-MCNC: 4.9 G/DL — SIGNIFICANT CHANGE UP (ref 3.5–5.2)
ALP SERPL-CCNC: 53 U/L — SIGNIFICANT CHANGE UP (ref 30–115)
ALT FLD-CCNC: 32 U/L — SIGNIFICANT CHANGE UP (ref 0–41)
ANION GAP SERPL CALC-SCNC: 9 MMOL/L — SIGNIFICANT CHANGE UP (ref 7–14)
APPEARANCE UR: CLEAR — SIGNIFICANT CHANGE UP
APTT BLD: 28 SEC — SIGNIFICANT CHANGE UP (ref 27–39.2)
AST SERPL-CCNC: 25 U/L — SIGNIFICANT CHANGE UP (ref 0–41)
BASOPHILS # BLD AUTO: 0.03 K/UL — SIGNIFICANT CHANGE UP (ref 0–0.2)
BASOPHILS NFR BLD AUTO: 0.4 % — SIGNIFICANT CHANGE UP (ref 0–1)
BILIRUB SERPL-MCNC: 0.4 MG/DL — SIGNIFICANT CHANGE UP (ref 0.2–1.2)
BILIRUB UR-MCNC: NEGATIVE — SIGNIFICANT CHANGE UP
BUN SERPL-MCNC: 26 MG/DL — HIGH (ref 10–20)
CALCIUM SERPL-MCNC: 9.9 MG/DL — SIGNIFICANT CHANGE UP (ref 8.4–10.5)
CHLORIDE SERPL-SCNC: 101 MMOL/L — SIGNIFICANT CHANGE UP (ref 98–110)
CO2 SERPL-SCNC: 29 MMOL/L — SIGNIFICANT CHANGE UP (ref 17–32)
COLOR SPEC: YELLOW — SIGNIFICANT CHANGE UP
CREAT SERPL-MCNC: 0.9 MG/DL — SIGNIFICANT CHANGE UP (ref 0.7–1.5)
DIFF PNL FLD: NEGATIVE — SIGNIFICANT CHANGE UP
EGFR: 64 ML/MIN/1.73M2 — SIGNIFICANT CHANGE UP
EOSINOPHIL # BLD AUTO: 0.04 K/UL — SIGNIFICANT CHANGE UP (ref 0–0.7)
EOSINOPHIL NFR BLD AUTO: 0.6 % — SIGNIFICANT CHANGE UP (ref 0–8)
GLUCOSE SERPL-MCNC: 124 MG/DL — HIGH (ref 70–99)
GLUCOSE UR QL: NEGATIVE MG/DL — SIGNIFICANT CHANGE UP
HCT VFR BLD CALC: 33.1 % — LOW (ref 37–47)
HGB BLD-MCNC: 11 G/DL — LOW (ref 12–16)
IMM GRANULOCYTES NFR BLD AUTO: 0.9 % — HIGH (ref 0.1–0.3)
INR BLD: 0.93 RATIO — SIGNIFICANT CHANGE UP (ref 0.65–1.3)
KETONES UR-MCNC: NEGATIVE MG/DL — SIGNIFICANT CHANGE UP
LEUKOCYTE ESTERASE UR-ACNC: NEGATIVE — SIGNIFICANT CHANGE UP
LYMPHOCYTES # BLD AUTO: 1.94 K/UL — SIGNIFICANT CHANGE UP (ref 1.2–3.4)
LYMPHOCYTES # BLD AUTO: 28 % — SIGNIFICANT CHANGE UP (ref 20.5–51.1)
MCHC RBC-ENTMCNC: 31.3 PG — HIGH (ref 27–31)
MCHC RBC-ENTMCNC: 33.2 G/DL — SIGNIFICANT CHANGE UP (ref 32–37)
MCV RBC AUTO: 94.3 FL — SIGNIFICANT CHANGE UP (ref 81–99)
MONOCYTES # BLD AUTO: 0.83 K/UL — HIGH (ref 0.1–0.6)
MONOCYTES NFR BLD AUTO: 12 % — HIGH (ref 1.7–9.3)
NEUTROPHILS # BLD AUTO: 4.03 K/UL — SIGNIFICANT CHANGE UP (ref 1.4–6.5)
NEUTROPHILS NFR BLD AUTO: 58.1 % — SIGNIFICANT CHANGE UP (ref 42.2–75.2)
NITRITE UR-MCNC: NEGATIVE — SIGNIFICANT CHANGE UP
NRBC # BLD: 0 /100 WBCS — SIGNIFICANT CHANGE UP (ref 0–0)
PH UR: 6 — SIGNIFICANT CHANGE UP (ref 5–8)
PLATELET # BLD AUTO: 375 K/UL — SIGNIFICANT CHANGE UP (ref 130–400)
PMV BLD: 9.2 FL — SIGNIFICANT CHANGE UP (ref 7.4–10.4)
POTASSIUM SERPL-MCNC: 4.3 MMOL/L — SIGNIFICANT CHANGE UP (ref 3.5–5)
POTASSIUM SERPL-SCNC: 4.3 MMOL/L — SIGNIFICANT CHANGE UP (ref 3.5–5)
PROT SERPL-MCNC: 7.2 G/DL — SIGNIFICANT CHANGE UP (ref 6–8)
PROT UR-MCNC: NEGATIVE MG/DL — SIGNIFICANT CHANGE UP
PROTHROM AB SERPL-ACNC: 10.6 SEC — SIGNIFICANT CHANGE UP (ref 9.95–12.87)
RBC # BLD: 3.51 M/UL — LOW (ref 4.2–5.4)
RBC # FLD: 12.8 % — SIGNIFICANT CHANGE UP (ref 11.5–14.5)
SODIUM SERPL-SCNC: 139 MMOL/L — SIGNIFICANT CHANGE UP (ref 135–146)
SP GR SPEC: 1.01 — SIGNIFICANT CHANGE UP (ref 1–1.03)
UROBILINOGEN FLD QL: 0.2 MG/DL — SIGNIFICANT CHANGE UP (ref 0.2–1)
WBC # BLD: 6.93 K/UL — SIGNIFICANT CHANGE UP (ref 4.8–10.8)
WBC # FLD AUTO: 6.93 K/UL — SIGNIFICANT CHANGE UP (ref 4.8–10.8)

## 2024-08-07 PROCEDURE — 36415 COLL VENOUS BLD VENIPUNCTURE: CPT

## 2024-08-07 PROCEDURE — 85730 THROMBOPLASTIN TIME PARTIAL: CPT

## 2024-08-07 PROCEDURE — 81003 URINALYSIS AUTO W/O SCOPE: CPT

## 2024-08-07 PROCEDURE — 85025 COMPLETE CBC W/AUTO DIFF WBC: CPT

## 2024-08-07 PROCEDURE — 87086 URINE CULTURE/COLONY COUNT: CPT

## 2024-08-07 PROCEDURE — 80053 COMPREHEN METABOLIC PANEL: CPT

## 2024-08-07 PROCEDURE — 85610 PROTHROMBIN TIME: CPT

## 2024-08-07 PROCEDURE — 93005 ELECTROCARDIOGRAM TRACING: CPT

## 2024-08-07 PROCEDURE — 99214 OFFICE O/P EST MOD 30 MIN: CPT | Mod: 25

## 2024-08-07 PROCEDURE — 93010 ELECTROCARDIOGRAM REPORT: CPT

## 2024-08-07 NOTE — H&P PST ADULT - PRIMARY CARE PROVIDER
RACCO- LV LAST WEEK    OBYRNE- LV 7/2024      HARISH- LV 3/2024     SARAH- LV 4 M AGO     JIMI- BENI CASILLAS- LV 2/2024

## 2024-08-07 NOTE — H&P PST ADULT - NSICDXPASTSURGICALHX_GEN_ALL_CORE_FT
PAST SURGICAL HISTORY:  Abscess, ovarian     H/O cholecystitis h/o lap neil    H/O discectomy     History of back surgery     History of kidney stones     History of total right hip replacement     S/P cataract surgery

## 2024-08-07 NOTE — H&P PST ADULT - NSICDXPASTMEDICALHX_GEN_ALL_CORE_FT
PAST MEDICAL HISTORY:  Abnormal cholesterol test     Acquired cataract     Acute depression     Acute gastritis     Atypical migraine     Chronic GERD     Factor V Leiden     Generalized OA     H/O spinal stenosis     H/O vertigo     Mild HTN     MS (multiple sclerosis)     Rheumatoid arthritis

## 2024-08-07 NOTE — H&P PST ADULT - REASON FOR ADMISSION
80 Y/O F WITH PMHX RA, HTN, HLD, ?MS VS DEMYELINATION, JOSÉ ON CPAP, FACTOR V ON BABY ASPIRIN, SPINAL STENOSIS, SCHEDULED FOR PAST FOR  RIGHT INGUINAL HERNIA REPAIR WITH [facility] UNDER LSB ON 8/21/24. PT REPORTS EPISODE OF RIGHT GROIN PAIN BRINGING HER TO THE ED WHERE SHE WAS FOUND TO HAVE RIGHT INGUINAL HERNIA. SHE REPORTS TENDERNESS TO RIGHT GROIN AREA. SHE ALSO REPORTS SHE HAS A LEFT INGUINAL HERNIA AS WELL.

## 2024-08-07 NOTE — H&P PST ADULT - WEIGHT IN KG
Computerized Dynamic Posturography (CDP)     Background Information: Patient was seen today for a Computerized Dynamic Posturography (CDP) evaluation. He reports symptoms of pulling, sinking, spinning at variable times during the day (no pattern reported- possible motion intolerance with fatigue component. Symptoms started when in [place] in February 2020. Patient also had BPPV which was treated in July 2020, but continued above listed symptoms remain   Medical history of fibromyalgia     Test Results and Procedures:     Sensory Organization Test:   Overall composite equilibrium score was 57.5.  Age matched normative value is above 70 .  Composite sensory scores   Somatosensory: 95 (age matched norms: 90).   Vision: 62 (age matched norms: 74).   Vestibular: 36 (age matched norms: 55).   Visual Preference: 97 (age matched norms: 86).  Patient s [facility] [place] was midline statically and shifting to the right/anterior dynamically .  Patient's main balance strategy used in testing was ankle for condiitons 1-3, increased knee/hip strategies with conditions 4-6.  Falls were recorded on conditions 6.  Imbalance demonstrated on conditions conditions 4-6.  Symptoms reported on conditions imbalance; no sinking/pulling/spinning sensations.     Summary and Recommendations:   These findings are consistent with impaired sensory organization- especially visual and vestibular integration. Impaired balance reactions as demonstrated with knee/hip strategies chosen over ankle strategy with increasing complexity of task   The patient's ability to maintain upright stance under a variety of changing sensory inputs is abnormal- especially with platform movement  Recommend follow up with audiology for vestibular testing (patient has order from outside ENT, but has not completed yet).  Continued vestibular rehab may be helpful/beneficial improve symptom management and balance reactions/stability for improved activity  participation.    Eli Benoit PT, DPT  Physical Therapist  [facility] - [address]  [address] [place] [address]  [email or web address]  Office: [phone]       64.4

## 2024-08-07 NOTE — H&P PST ADULT - HISTORY OF PRESENT ILLNESS
PATIENT CURRENTLY DENIES CHEST PAIN  SHORTNESS OF BREATH  PALPITATIONS,  DYSURIA, OR UPPER RESPIRATORY INFECTION IN PAST 2 WEEKS  PT REPORTS 2/2024 SHE WAS TREATED FOR PERICARDITIS POST COVID     Denies travel outside the [place] in the past 30 days  Patient denies any signs or symptoms of COVID 19 and denies contact with known positive individuals.         Anesthesia Alert  YES--Difficult Airway CLASS IV  NO--History of neck surgery or radiation  NO--Limited ROM of neck  NO--History of Malignant hyperthermia  NO--No personal or family history of Pseudocholinesterase deficiency.  YES--Prior Anesthesia Complication +PONV   NO--Latex Allergy  NO--Loose teeth  YES--History of Rheumatoid Arthritis  YES--Bleeding risk ASPIRIN 81 MG FACTOR V LEIDEN   YES--JOSÉ CPAP AT TIMES  YES--Other_____ DAILY PREDNISONE FOR RA    DASI  Zhou Activity Status Index (DASI)  RESULT SUMMARY:  12.7 points  The higher the score (maximum 58.2), the higher the functional status.    4.30 METs    INPUTS:  Take care of self —> 2.75 = Yes  Walk indoors —> 1.75 = Yes  Walk 1&ndash;2 blocks on level ground —> 0 = No  Climb a flight of stairs or walk up a hill —> 5.5 = Yes  Run a short distance —> 0 = No  Do light work around the house —> 2.7 = Yes  Do moderate work around the house —> 0 = No  Do heavy work around the house —> 0 = No  Do yardwork —> 0 = No  Have sexual relations —> 0 = No  Participate in moderate recreational activities —> 0 = No  Participate in strenuous sports —> 0 = No    RCRI 0    PT DENIES ANY RASHES, ABRASION, OR OPEN WOUNDS OR CUTS    AS PER THE PT, THIS IS HIS/HER COMPLETE MEDICAL AND SURGICAL HX, INCLUDING MEDICATIONS PRESCRIBED AND OVER THE COUNTER    Patient/Guardian understands the instructions and was given the opportunity to ask questions and have them answered.    pt denies any suicidal ideation or thoughts, pt states not a threat to self or others   PATIENT CURRENTLY DENIES CHEST PAIN  SHORTNESS OF BREATH  PALPITATIONS,  DYSURIA, OR UPPER RESPIRATORY INFECTION IN PAST 2 WEEKS  PT REPORTS 2/2024 SHE WAS TREATED FOR PERICARDITIS POST COVID   reports recent treatment for uti    Denies travel outside the [place] in the past 30 days  Patient denies any signs or symptoms of COVID 19 and denies contact with known positive individuals.         Anesthesia Alert  YES--Difficult Airway CLASS IV  NO--History of neck surgery or radiation  NO--Limited ROM of neck  NO--History of Malignant hyperthermia  NO--No personal or family history of Pseudocholinesterase deficiency.  YES--Prior Anesthesia Complication +PONV   NO--Latex Allergy  NO--Loose teeth  YES--History of Rheumatoid Arthritis  YES--Bleeding risk ASPIRIN 81 MG FACTOR V LEIDEN   YES--JOSÉ CPAP AT TIMES  YES--Other_____ DAILY PREDNISONE FOR RA    DASI  Zhou Activity Status Index (DASI)  RESULT SUMMARY:  12.7 points  The higher the score (maximum 58.2), the higher the functional status.    4.30 METs    INPUTS:  Take care of self —> 2.75 = Yes  Walk indoors —> 1.75 = Yes  Walk 1&ndash;2 blocks on level ground —> 0 = No  Climb a flight of stairs or walk up a hill —> 5.5 = Yes  Run a short distance —> 0 = No  Do light work around the house —> 2.7 = Yes  Do moderate work around the house —> 0 = No  Do heavy work around the house —> 0 = No  Do yardwork —> 0 = No  Have sexual relations —> 0 = No  Participate in moderate recreational activities —> 0 = No  Participate in strenuous sports —> 0 = No    RCRI 0    PT DENIES ANY RASHES, ABRASION, OR OPEN WOUNDS OR CUTS    AS PER THE PT, THIS IS HIS/HER COMPLETE MEDICAL AND SURGICAL HX, INCLUDING MEDICATIONS PRESCRIBED AND OVER THE COUNTER    Patient/Guardian understands the instructions and was given the opportunity to ask questions and have them answered.    pt denies any suicidal ideation or thoughts, pt states not a threat to self or others

## 2024-08-08 DIAGNOSIS — Z01.818 ENCOUNTER FOR OTHER PREPROCEDURAL EXAMINATION: ICD-10-CM

## 2024-08-08 DIAGNOSIS — K40.90 UNILATERAL INGUINAL HERNIA, WITHOUT OBSTRUCTION OR GANGRENE, NOT SPECIFIED AS RECURRENT: ICD-10-CM

## 2024-08-08 PROBLEM — Z87.39 PERSONAL HISTORY OF OTHER DISEASES OF THE MUSCULOSKELETAL SYSTEM AND CONNECTIVE TISSUE: Chronic | Status: ACTIVE | Noted: 2024-08-07

## 2024-08-08 LAB
CULTURE RESULTS: SIGNIFICANT CHANGE UP
SPECIMEN SOURCE: SIGNIFICANT CHANGE UP

## 2024-08-14 ENCOUNTER — APPOINTMENT (OUTPATIENT)
Dept: ORTHOPEDIC SURGERY | Facility: CLINIC | Age: 79
End: 2024-08-14
Payer: MEDICARE

## 2024-08-14 DIAGNOSIS — M84.375A STRESS FRACTURE, LEFT FOOT, INITIAL ENCOUNTER FOR FRACTURE: ICD-10-CM

## 2024-08-14 PROCEDURE — 28470 CLTX METATARSAL FX WO MNP EA: CPT | Mod: LT

## 2024-08-14 PROCEDURE — 99203 OFFICE O/P NEW LOW 30 MIN: CPT | Mod: 57

## 2024-08-14 NOTE — HISTORY OF PRESENT ILLNESS
[de-identified] : This is a 79-year-old patient see me for the first time for her left foot pain. And some pain couple weeks ago.  She was seen by one of our physician assistants and subsequently prescribed a boot as well as a MRI.  She reports that the pain is improved.  Localized the pain to the base of the fifth metatarsal alone.

## 2024-08-14 NOTE — PHYSICAL EXAM
[de-identified] : She is alert with x 3.  Pleasant cooperative.  I examined her left lower extremity.  She is somewhat tender at the base of the fifth metatarsal but not overly so.  There is no malalignment or bony crepitus or instability.  Compartment soft compressible.  Neurovascular intact distally.

## 2024-08-14 NOTE — DISCUSSION/SUMMARY
[de-identified] : Patient's condition was discussed with her.  I recommended that she stay inside the postop shoe for another 3 weeks.  She is to take vitamin D.  We will initiate fracture care.  All questions sought and answered.

## 2024-08-14 NOTE — DATA REVIEWED
[FreeTextEntry1] : I reviewed the patient's x-rays.  I also reviewed MRI.  X-rays and MRI demonstrated of a nondisplaced stress fracture to the base of the fifth metatarsal.

## 2024-08-20 ENCOUNTER — APPOINTMENT (OUTPATIENT)
Dept: PULMONOLOGY | Facility: CLINIC | Age: 79
End: 2024-08-20
Payer: MEDICARE

## 2024-08-20 VITALS
OXYGEN SATURATION: 98 % | RESPIRATION RATE: 14 BRPM | SYSTOLIC BLOOD PRESSURE: 110 MMHG | BODY MASS INDEX: 27 KG/M2 | HEIGHT: 61 IN | HEART RATE: 74 BPM | DIASTOLIC BLOOD PRESSURE: 60 MMHG | WEIGHT: 143 LBS

## 2024-08-20 DIAGNOSIS — R06.09 OTHER FORMS OF DYSPNEA: ICD-10-CM

## 2024-08-20 DIAGNOSIS — G47.33 OBSTRUCTIVE SLEEP APNEA (ADULT) (PEDIATRIC): ICD-10-CM

## 2024-08-20 PROCEDURE — G2211 COMPLEX E/M VISIT ADD ON: CPT

## 2024-08-20 PROCEDURE — 99213 OFFICE O/P EST LOW 20 MIN: CPT

## 2024-08-20 NOTE — HISTORY OF PRESENT ILLNESS
[TextBox_4] : JOSÉ COMPLIANT AND BENEFITING REVIEW DOWNLOAD SOB ON EXERTION SP RECENT COVID SP PERICARDITIS

## 2024-08-20 NOTE — DISCUSSION/SUMMARY
[FreeTextEntry1] : JOSÉ COMPLIANT AND BENEFITING SOB ON EXERTION SP COVID/ PERICXARDITIS RA ON PREDNISONE ORDER SUPPLIES

## 2024-08-21 ENCOUNTER — APPOINTMENT (OUTPATIENT)
Dept: SURGERY | Facility: AMBULATORY SURGERY CENTER | Age: 79
End: 2024-08-21
Payer: MEDICARE

## 2024-08-21 ENCOUNTER — TRANSCRIPTION ENCOUNTER (OUTPATIENT)
Age: 79
End: 2024-08-21

## 2024-08-21 PROCEDURE — 49505 PRP I/HERN INIT REDUC >5 YR: CPT | Mod: RT

## 2024-08-21 RX ORDER — ASPIRIN 500 MG
81 TABLET ORAL
Refills: 0 | DISCHARGE

## 2024-08-21 RX ORDER — DEXAMETHASONE 1.5 MG/1
10 TABLET ORAL
Refills: 0 | DISCHARGE

## 2024-08-21 RX ORDER — METOPROLOL TARTRATE 100 MG
1 TABLET ORAL
Refills: 0 | DISCHARGE

## 2024-08-21 RX ORDER — TRAMADOL HCL 50 MG
1 TABLET ORAL
Refills: 0 | DISCHARGE

## 2024-08-21 RX ORDER — PREDNISONE 10 MG/1
3 TABLET ORAL
Refills: 0 | DISCHARGE

## 2024-08-21 RX ORDER — ESTRADIOL 2 MG/1
0 TABLET ORAL
Refills: 0 | DISCHARGE

## 2024-08-21 RX ORDER — OMEPRAZOLE 100 %
1 POWDER (GRAM) MISCELLANEOUS
Refills: 0 | DISCHARGE

## 2024-08-21 RX ORDER — LAMOTRIGINE 100 MG/1
1 TABLET ORAL
Refills: 0 | DISCHARGE

## 2024-08-21 RX ORDER — FUROSEMIDE 10 MG/ML
1 INJECTION, SOLUTION INTRAVENOUS
Refills: 0 | DISCHARGE

## 2024-08-21 RX ORDER — VALSARTAN 40 MG/1
0 TABLET, FILM COATED ORAL
Refills: 0 | DISCHARGE

## 2024-08-21 RX ORDER — DENOSUMAB 60 MG/ML
60 INJECTION SUBCUTANEOUS
Refills: 0 | DISCHARGE

## 2024-08-21 RX ORDER — DILTIAZEM HCL 90 MG
1 TABLET ORAL
Refills: 0 | DISCHARGE

## 2024-08-21 RX ORDER — RIZATRIPTAN BENZOATE 5 MG
1 TABLET ORAL
Refills: 0 | DISCHARGE

## 2024-08-21 RX ORDER — BIMATOPROST 0.3 MG/ML
1 SOLUTION/ DROPS OPHTHALMIC
Refills: 0 | DISCHARGE

## 2024-08-21 RX ORDER — UPADACITINIB 45 MG/1
1 TABLET, EXTENDED RELEASE ORAL
Refills: 0 | DISCHARGE

## 2024-08-21 RX ORDER — CYCLOBENZAPRINE HCL 10 MG
1 TABLET ORAL
Refills: 0 | DISCHARGE

## 2024-08-28 ENCOUNTER — APPOINTMENT (OUTPATIENT)
Dept: SURGERY | Facility: CLINIC | Age: 79
End: 2024-08-28
Payer: MEDICARE

## 2024-08-28 DIAGNOSIS — K40.90 UNILATERAL INGUINAL HERNIA, W/OUT OBSTRUCTION OR GANGRENE, NOT SPECIFIED AS RECURRENT: ICD-10-CM

## 2024-08-28 PROCEDURE — 99024 POSTOP FOLLOW-UP VISIT: CPT

## 2024-08-28 RX ORDER — TRAMADOL HYDROCHLORIDE 50 MG/1
50 TABLET, COATED ORAL
Qty: 18 | Refills: 0 | Status: COMPLETED | COMMUNITY
Start: 2024-08-20 | End: 2024-08-28

## 2024-08-28 NOTE — REASON FOR VISIT
Anesthesia PreOp Note    HPI:     Cammie Nunn is a 54 year old female who presents for preoperative consultation requested by: Behery, Omar Atef, MD    Date of Surgery: 8/28/2024    Procedure(s):  Left anterior total hip arthroplasty  Indication: Left Femoral Neck Fracture    Relevant Problems   No relevant active problems       NPO:                         History Review:  Patient Active Problem List    Diagnosis Date Noted    Primary hypertension 08/14/2024    Arthritis of right hip 08/14/2024    Primary biliary cholangitis (HCC) 08/14/2024    Autoimmune hepatitis (HCC) 08/14/2024    Hypokalemia 08/14/2024    Elevated INR 08/14/2024    Hyperbilirubinemia 08/14/2024    Open wound 01/17/2023    Wound dehiscence 01/06/2023    Encounter for postoperative wound care 01/06/2023    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    Acute cholecystitis 11/28/2022    Biliary colic 11/28/2022    Choledocholithiasis 11/28/2022    Mirizzi's syndrome (HCC)     Hyperglycemia 03/24/2022    Transaminitis 03/24/2022    Hyponatremia 03/24/2022    Abdominal pain, acute 03/24/2022    Nausea vomiting and diarrhea 03/24/2022    Uncontrolled hypertension 03/24/2022    Calculus of gallbladder and bile duct with acute cholecystitis, with obstruction 03/24/2022       Past Medical History:    Disorder of liver    autoimmune disease    Essential hypertension    not taking medications    High blood pressure    off medications since 2022 per patient    High cholesterol    Mirizzi's syndrome (HCC)    Osteoarthritis    Visual impairment       Past Surgical History:   Procedure Laterality Date    Cholecystectomy      Colonoscopy N/A 12/12/2023    Procedure: COLONOSCOPY;  Surgeon: Jeison Diamond MD;  Location: [facility] ENDOSCOPY    Tonsillectomy         No medications prior to admission.     Current Facility-Administered Medications Ordered in Epic   Medication Dose Route Frequency Provider Last Rate Last Admin     clonidine-EPINEPHrine-ropivacaine-ketorolac (CERTS) (Duraclon-Adrenalin-Naropin-Toradol) pain cocktail irrigation   Intra-articular Once (Intra-Op) Behery, Omar Atef, MD         Current Outpatient Medications Ordered in Epic   Medication Sig Dispense Refill    Mycophenolate Mofetil 500 MG Oral Tab Take 1 tablet (500 mg total) by mouth 2 (two) times daily.      PREDNISONE 10 MG Oral Tab TAKE 4 TABLETS BY MOUTH ONCE DAILY (Patient taking differently: Take 2 tablets (20 mg total) by mouth daily.) 120 tablet 0    ursodiol 300 MG Oral Cap Take 2 capsules (600 mg total) by mouth 2 (two) times daily. 360 capsule 3       No Known Allergies    Family History   Problem Relation Age of Onset    Lipids Mother     Hypertension Mother     Diabetes Mother     Cancer Mother      Social History     Socioeconomic History    Marital status: Single   Tobacco Use    Smoking status: Never     Passive exposure: Never    Smokeless tobacco: Never   Vaping Use    Vaping status: Never Used   Substance and Sexual Activity    Alcohol use: Not Currently     Comment: Every 1 now and then    Drug use: Never       Available pre-op labs reviewed.  Lab Results   Component Value Date    WBC 12.5 (H) 08/14/2024    RBC 3.64 (L) 08/14/2024    HGB 11.3 (L) 08/14/2024    HCT 34.6 (L) 08/14/2024    MCV 95.1 08/14/2024    MCH 31.0 08/14/2024    MCHC 32.7 08/14/2024    RDW 17.5 (H) 08/14/2024    .0 08/14/2024     Lab Results   Component Value Date     08/14/2024    K 3.0 (L) 08/14/2024     08/14/2024    CO2 23.0 08/14/2024    BUN 12 08/14/2024    CREATSERUM 0.89 08/14/2024    GLU 92 08/14/2024    CA 8.9 08/14/2024     Lab Results   Component Value Date    INR 1.49 (H) 08/14/2024       Vital Signs:  Body mass index is 29.01 kg/m².   height is 1.626 m (5' 4\") and weight is 76.7 kg (169 lb).   Vitals:    08/13/24 1325   Weight: 76.7 kg (169 lb)   Height: 1.626 m (5' 4\")        Anesthesia Evaluation     Patient summary reviewed and Nursing  notes reviewed    Airway   Mallampati: II  TM distance: >3 FB  Neck ROM: full  Dental          Pulmonary - negative ROS and normal exam   Cardiovascular - normal exam  Exercise tolerance: good  (+) hypertension    NYHA Classification: I  ECG reviewed  ROS comment:      Narrative  Performed by: MUSE    Normal sinus rhythm  Minimal voltage criteria for LVH, may be normal variant ( R in aVL )  Borderline ECG  No previous ECGs found in [facility]  Confirmed by CANDIE FERRARO MICHAEL ([id number]) on 8/14/2024 3:42:49 PM    Specimen Collected: 08/14/24 12:06          Neuro/Psych - negative ROS     GI/Hepatic/Renal    (+) liver disease    Comments: Assessment and Plan:  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 Discussed that if she develops liver failure in context of non-compliance it would be highly unlikely anyone would consider her eligible for liver transplant. Discussed the potential to reverse course and hopefully get some liver recovery with compliance and close monitoring. She expresses a motivation to improve on compliance.   - Updated labs today  - For now, continue AZA 50 mg daily but may consider switching to cellcept due to ease of titration given her inconsistent follow up  - Given her history of biliary issues and cholestatic profile, discussed need to complete MRCP as previously recommended   - Discussed that given her advanced fibrosis /bridging fibrosis she needs ongoing HCC screening. Will get MRI with MRCP  - Check immunity to hepatitis A  - Follow up ~ 2 months       Endo/Other    (-) diabetes mellitus    Comments:    ASSESSMENT AND PLAN:  Cammie Nunn is a 54 year old female, with a hx of severe degenerative arthritis left hip who presents for a pre-operative physical exam. Patient is to have left MALCOLM by Dr. Behery on 8/28/24.       Arthritis of  right hip  M16.11         Mirizzi's syndrome (HCC)  K83.1         Primary biliary cholangitis (HCC)  K74.3         Autoimmune hepatitis (HCC)  K75.4         Primary hypertension  I10         Elevated INR  R79.1         Transaminitis  R74.01         Hyperbilirubinemia  E80.6         Hypokalemia  E87.6                 ECG and CXR are acceptable for surgery. Potassium supplement started and repeat labs ordered.         Preoperative Risk Stratification: There are no decompensated medical conditions. ASA classification 3        Patient has a moderately elevated risk for surgery due to serious liver disease. Although her INR is only modestly elevated, she has a significant risk of increase blood loss with joint replacement. She also may be difficult to manage with anticoagulation post operatively for DVT prevention.        Plan:      -hepatology recommendation for post op anticoagulation.      -she will need steroid stress doses in perioperative period     -repeat labs to follow up on K+ and LFTs     -Addendum to follow    Abdominal  - normal exam     Other findings: 08/14/24 11:53  Glucose: 92  Sodium: 143  Potassium: 3.0 (L)  Chloride: 110  Carbon Dioxide, Total: 23.0  BUN: 12  CREATININE: 0.89  CALCIUM: 8.9  BUN/CREATININE RATIO: 13.5  EGFR: 76  ANION GAP: 10  CALCULATED OSMOLALITY: 295  ALKALINE PHOSPHATASE: 285 (H)  AST (SGOT): 162 (H)  ALT (SGPT): 123 (H)  Total Bilirubin: 18.7 (H)  Globulin: 3.6 (H)  A/G Ratio: 1.0  PROTEIN, TOTAL: 7.1  Albumin: 3.5  Patient Fasting for CMP?: Yes  PT: 18.9 (H)  INR: 1.49 (H)  APTT: 33.1      (L): Data is abnormally low  (H): Data is abnormally high            Anesthesia Plan:   ASA:  4  Plan:   Spinal and MAC  Plan Comments: INR is high for SA   Will be repeated today  08/28/24 13:14  WBC: 10.5  Hemoglobin: 10.8 (L)  Hematocrit: 32.6 (L)  Platelet Count: 153.0  RBC: 3.39 (L)  MCH: 31.9  MCHC: 33.1  MCV: 96.2  RDW: 17.3 (H)  RDW-SD: 60.3 (H)  Prelim Neutrophil Abs: 7.50  Neutrophils  Absolute: 7.50  Lymphocytes Absolute: 2.25  Monocytes Absolute: 0.65  Eosinophils Absolute: 0.00  Basophils Absolute: 0.03  Immature Granulocyte Absolute: 0.11  Neutrophils %: 71.2  Lymphocytes %: 21.3  Monocytes %: 6.2  Eosinophils %: 0.0  Basophils %: 0.3    08/28/24 13:31  PT: 16.8 (H)  INR: 1.28 (H)      (L): Data is abnormally low  (H): Data is abnormally high  Informed Consent Plan and Risks Discussed With:  Patient  Discussed plan with:  Attending and surgeon  Provider Attestation (if preop done by other):  SA poss GA if INR is high  Explained risks for liver enzimes and possible effect of GA  GA/ETT/PONV,dental damages etc  No SA   D/w Hepatologist over the phone we can proceed with GA      I have informed Cammie Skinnerd and/or legal guardian or family member of the nature of the anesthetic plan, benefits, risks including possible dental damage if relevant, major complications, and any alternative forms of anesthetic management.   All of the patient's questions were answered to the best of my ability. The patient desires the anesthetic management as planned.  I have informed this Cammie Nunn  of the risks of neuraxial anesthesia including, but not limited to: failure,headache, backache, spinal, unilateral/patchy block, difficulty breathing, infection, bleeding, nerve damage, paralysis, death. The patient desires the proposed neuraxial anesthetic as planned.    ESTER CHRISTIANSEN MD  8/28/2024 11:53 AM  Present on Admission:  **None**       [Consultation] : a consultation visit [FreeTextEntry1] : NP - Ref by Dr. Polo for Gastritis

## 2024-08-28 NOTE — CONSULT LETTER
[Dear  ___] : Dear  [unfilled], [Courtesy Letter:] : I had the pleasure of seeing your patient, [unfilled], in my office today. [Please see my note below.] : Please see my note below. [Consult Closing:] : Thank you very much for allowing me to participate in the care of this patient.  If you have any questions, please do not hesitate to contact me. [Sincerely,] : Sincerely, [FreeTextEntry3] :     Judy Guerrero PA-C, MSPAS [DrBraden  ___] : Dr. PETTIT [DrBraden ___] : Dr. PETTIT

## 2024-08-28 NOTE — ASSESSMENT
[FreeTextEntry1] : VIRGIE ZAMORA underwent a large indirect right inguinal hernia repair with mesh with Dr. Sharp on 8/21/24 under local IV sedation without any problems or complications. Her wound is clean, dry and intact. There is no evidence of erythema, seroma formation or infection. She is tolerating a diet and having normal bowel movements. She denies any significant postoperative pain or discomfort at this time.   She was counseled and reassured. VIRGIE was discharged from the office with no specific follow up necessary, but she knows to avoid any heavy lifting or strenuous activity for the next several weeks.

## 2024-09-09 ENCOUNTER — APPOINTMENT (OUTPATIENT)
Dept: ORTHOPEDIC SURGERY | Facility: CLINIC | Age: 79
End: 2024-09-09
Payer: MEDICARE

## 2024-09-09 DIAGNOSIS — M84.375A STRESS FRACTURE, LEFT FOOT, INITIAL ENCOUNTER FOR FRACTURE: ICD-10-CM

## 2024-09-09 PROCEDURE — 73630 X-RAY EXAM OF FOOT: CPT | Mod: LT

## 2024-09-09 PROCEDURE — 99024 POSTOP FOLLOW-UP VISIT: CPT

## 2024-09-09 RX ORDER — VITAMIN K2 90 MCG
125 MCG CAPSULE ORAL
Qty: 30 | Refills: 2 | Status: ACTIVE | COMMUNITY
Start: 2024-09-09 | End: 1900-01-01

## 2024-09-09 NOTE — DISCUSSION/SUMMARY
[de-identified] : I recommend the patient's start vitamin D supplementation.  She will wear the postop shoe for 2 more weeks then transition to a sneaker.  I will see her back in 1 month.

## 2024-09-09 NOTE — PHYSICAL EXAM
[de-identified] : She remains tender over the fifth metatarsal base.  No bony crepitus or instability.  Neurovascular intact.

## 2024-09-09 NOTE — HISTORY OF PRESENT ILLNESS
[de-identified] : Patient reports that she is still having some pain especially when walking on it.  Less pain than before but still definitely present.  Does not endorse any interval trauma.

## 2024-09-09 NOTE — DATA REVIEWED
[FreeTextEntry1] : 3 views left foot ordered reviewed by me personally.  X-rays demonstrate healing fracture zone one of the base of the fifth metatarsal fracture.  There is callus remission present.

## 2024-09-30 ENCOUNTER — APPOINTMENT (OUTPATIENT)
Dept: ORTHOPEDIC SURGERY | Facility: CLINIC | Age: 79
End: 2024-09-30
Payer: MEDICARE

## 2024-09-30 DIAGNOSIS — M84.375A STRESS FRACTURE, LEFT FOOT, INITIAL ENCOUNTER FOR FRACTURE: ICD-10-CM

## 2024-09-30 PROCEDURE — 73630 X-RAY EXAM OF FOOT: CPT | Mod: LT

## 2024-09-30 PROCEDURE — 99024 POSTOP FOLLOW-UP VISIT: CPT

## 2024-09-30 NOTE — DATA REVIEWED
[FreeTextEntry1] : 3 views of the left foot ordered reviewed by me personally.  X-rays do demonstrate evidence of callus remission around the fracture site.

## 2024-09-30 NOTE — HISTORY OF PRESENT ILLNESS
[de-identified] : Patient here for follow-up of her left foot fifth metatarsal stress fracture.  Doing well.  Does not endorse any worsening pain.  She did fall since the last time I saw her and has pain involving the knee and her left foot but her left foot pain has resolved.

## 2024-09-30 NOTE — DISCUSSION/SUMMARY
[de-identified] : Continue with vitamin D supplementation.  Continue with weightbearing as tolerated in a supportive sneaker.  I will see her back in 1 month.

## 2024-09-30 NOTE — PHYSICAL EXAM
[de-identified] : She is still mildly tender over the fifth metatarsal.  Nontender elsewhere.  No instability or malalignment noted.  Neurovascular intact.

## 2024-10-18 ENCOUNTER — APPOINTMENT (OUTPATIENT)
Dept: ORTHOPEDIC SURGERY | Facility: CLINIC | Age: 79
End: 2024-10-18
Payer: MEDICARE

## 2024-10-18 DIAGNOSIS — M84.375A STRESS FRACTURE, LEFT FOOT, INITIAL ENCOUNTER FOR FRACTURE: ICD-10-CM

## 2024-10-18 PROCEDURE — 73630 X-RAY EXAM OF FOOT: CPT | Mod: LT

## 2024-10-18 PROCEDURE — 99024 POSTOP FOLLOW-UP VISIT: CPT

## 2024-12-11 ENCOUNTER — APPOINTMENT (OUTPATIENT)
Dept: ORTHOPEDIC SURGERY | Facility: CLINIC | Age: 79
End: 2024-12-11
Payer: MEDICARE

## 2024-12-11 DIAGNOSIS — M84.375A STRESS FRACTURE, LEFT FOOT, INITIAL ENCOUNTER FOR FRACTURE: ICD-10-CM

## 2024-12-11 PROCEDURE — 99213 OFFICE O/P EST LOW 20 MIN: CPT

## 2025-01-08 ENCOUNTER — APPOINTMENT (OUTPATIENT)
Dept: ORTHOPEDIC SURGERY | Facility: CLINIC | Age: 80
End: 2025-01-08
Payer: MEDICARE

## 2025-01-08 DIAGNOSIS — M84.375A STRESS FRACTURE, LEFT FOOT, INITIAL ENCOUNTER FOR FRACTURE: ICD-10-CM

## 2025-01-08 PROCEDURE — 99213 OFFICE O/P EST LOW 20 MIN: CPT

## 2025-01-21 ENCOUNTER — NON-APPOINTMENT (OUTPATIENT)
Age: 80
End: 2025-01-21

## 2025-02-06 ENCOUNTER — NON-APPOINTMENT (OUTPATIENT)
Age: 80
End: 2025-02-06

## 2025-02-06 ENCOUNTER — APPOINTMENT (OUTPATIENT)
Dept: OBGYN | Facility: CLINIC | Age: 80
End: 2025-02-06
Payer: MEDICARE

## 2025-02-06 VITALS — BODY MASS INDEX: 26.24 KG/M2 | HEIGHT: 61 IN | WEIGHT: 139 LBS

## 2025-02-06 DIAGNOSIS — L29.2 PRURITUS VULVAE: ICD-10-CM

## 2025-02-06 DIAGNOSIS — N88.8 OTHER SPECIFIED NONINFLAMMATORY DISORDERS OF CERVIX UTERI: ICD-10-CM

## 2025-02-06 PROCEDURE — 99203 OFFICE O/P NEW LOW 30 MIN: CPT

## 2025-02-06 PROCEDURE — 99459 PELVIC EXAMINATION: CPT

## 2025-02-06 RX ORDER — CLOTRIMAZOLE AND BETAMETHASONE DIPROPIONATE 10; .5 MG/G; MG/G
1-0.05 CREAM TOPICAL
Qty: 1 | Refills: 1 | Status: ACTIVE | COMMUNITY
Start: 2025-02-06 | End: 1900-01-01

## 2025-02-08 ENCOUNTER — RESULT REVIEW (OUTPATIENT)
Age: 80
End: 2025-02-08

## 2025-02-08 ENCOUNTER — OUTPATIENT (OUTPATIENT)
Dept: OUTPATIENT SERVICES | Facility: HOSPITAL | Age: 80
LOS: 1 days | End: 2025-02-08
Payer: MEDICARE

## 2025-02-08 DIAGNOSIS — Z87.19 PERSONAL HISTORY OF OTHER DISEASES OF THE DIGESTIVE SYSTEM: Chronic | ICD-10-CM

## 2025-02-08 DIAGNOSIS — Z98.49 CATARACT EXTRACTION STATUS, UNSPECIFIED EYE: Chronic | ICD-10-CM

## 2025-02-08 DIAGNOSIS — Z98.890 OTHER SPECIFIED POSTPROCEDURAL STATES: Chronic | ICD-10-CM

## 2025-02-08 DIAGNOSIS — N70.92 OOPHORITIS, UNSPECIFIED: Chronic | ICD-10-CM

## 2025-02-08 DIAGNOSIS — R92.2 INCONCLUSIVE MAMMOGRAM: ICD-10-CM

## 2025-02-08 DIAGNOSIS — Z96.641 PRESENCE OF RIGHT ARTIFICIAL HIP JOINT: Chronic | ICD-10-CM

## 2025-02-08 DIAGNOSIS — Z87.442 PERSONAL HISTORY OF URINARY CALCULI: Chronic | ICD-10-CM

## 2025-02-08 DIAGNOSIS — Z12.31 ENCOUNTER FOR SCREENING MAMMOGRAM FOR MALIGNANT NEOPLASM OF BREAST: ICD-10-CM

## 2025-02-08 PROCEDURE — 77063 BREAST TOMOSYNTHESIS BI: CPT

## 2025-02-08 PROCEDURE — 77067 SCR MAMMO BI INCL CAD: CPT | Mod: 26

## 2025-02-08 PROCEDURE — 77067 SCR MAMMO BI INCL CAD: CPT

## 2025-02-08 PROCEDURE — 76641 ULTRASOUND BREAST COMPLETE: CPT | Mod: 50

## 2025-02-08 PROCEDURE — 77063 BREAST TOMOSYNTHESIS BI: CPT | Mod: 26

## 2025-02-08 PROCEDURE — 76641 ULTRASOUND BREAST COMPLETE: CPT | Mod: 26,50

## 2025-02-09 DIAGNOSIS — R92.2 INCONCLUSIVE MAMMOGRAM: ICD-10-CM

## 2025-02-09 DIAGNOSIS — Z12.31 ENCOUNTER FOR SCREENING MAMMOGRAM FOR MALIGNANT NEOPLASM OF BREAST: ICD-10-CM

## 2025-02-10 RX ORDER — CLOBETASOL PROPIONATE 0.5 MG/G
0.05 OINTMENT TOPICAL
Qty: 1 | Refills: 0 | Status: ACTIVE | COMMUNITY
Start: 2025-02-10 | End: 1900-01-01

## 2025-02-13 ENCOUNTER — NON-APPOINTMENT (OUTPATIENT)
Age: 80
End: 2025-02-13

## 2025-02-13 ENCOUNTER — APPOINTMENT (OUTPATIENT)
Dept: NEUROLOGY | Facility: CLINIC | Age: 80
End: 2025-02-13
Payer: MEDICARE

## 2025-02-13 VITALS
HEIGHT: 61 IN | OXYGEN SATURATION: 95 % | DIASTOLIC BLOOD PRESSURE: 71 MMHG | BODY MASS INDEX: 26.62 KG/M2 | WEIGHT: 141 LBS | SYSTOLIC BLOOD PRESSURE: 148 MMHG | HEART RATE: 74 BPM

## 2025-02-13 PROCEDURE — 99214 OFFICE O/P EST MOD 30 MIN: CPT

## 2025-02-17 ENCOUNTER — TRANSCRIPTION ENCOUNTER (OUTPATIENT)
Age: 80
End: 2025-02-17

## 2025-02-18 ENCOUNTER — APPOINTMENT (OUTPATIENT)
Dept: BREAST CENTER | Facility: CLINIC | Age: 80
End: 2025-02-18
Payer: MEDICARE

## 2025-02-18 DIAGNOSIS — N60.12 DIFFUSE CYSTIC MASTOPATHY OF RIGHT BREAST: ICD-10-CM

## 2025-02-18 DIAGNOSIS — N60.11 DIFFUSE CYSTIC MASTOPATHY OF RIGHT BREAST: ICD-10-CM

## 2025-02-18 DIAGNOSIS — R92.30 DENSE BREASTS, UNSPECIFIED: ICD-10-CM

## 2025-02-18 PROCEDURE — 99214 OFFICE O/P EST MOD 30 MIN: CPT

## 2025-02-21 ENCOUNTER — NON-APPOINTMENT (OUTPATIENT)
Age: 80
End: 2025-02-21

## 2025-02-24 ENCOUNTER — NON-APPOINTMENT (OUTPATIENT)
Age: 80
End: 2025-02-24

## 2025-02-28 ENCOUNTER — APPOINTMENT (OUTPATIENT)
Age: 80
End: 2025-02-28
Payer: MEDICARE

## 2025-02-28 VITALS
RESPIRATION RATE: 17 BRPM | DIASTOLIC BLOOD PRESSURE: 79 MMHG | TEMPERATURE: 97.9 F | HEART RATE: 87 BPM | WEIGHT: 142.9 LBS | OXYGEN SATURATION: 98 % | HEIGHT: 61 IN | SYSTOLIC BLOOD PRESSURE: 165 MMHG | BODY MASS INDEX: 26.98 KG/M2

## 2025-02-28 VITALS
HEIGHT: 61 IN | RESPIRATION RATE: 16 BRPM | SYSTOLIC BLOOD PRESSURE: 165 MMHG | TEMPERATURE: 97.9 F | DIASTOLIC BLOOD PRESSURE: 79 MMHG | BODY MASS INDEX: 26.81 KG/M2 | WEIGHT: 142 LBS | HEART RATE: 87 BPM

## 2025-02-28 DIAGNOSIS — H26.9 UNSPECIFIED CATARACT: ICD-10-CM

## 2025-02-28 LAB
ALBUMIN SERPL ELPH-MCNC: 4.7 G/DL
ALP BLD-CCNC: 50 U/L
ALT SERPL-CCNC: 32 U/L
ANION GAP SERPL CALC-SCNC: 10 MMOL/L
APTT BLD: 30.2 SEC
AST SERPL-CCNC: 25 U/L
AUTO BASOPHILS #: 0.03 K/UL
AUTO BASOPHILS %: 0.3 %
AUTO EOSINOPHILS #: 0.04 K/UL
AUTO EOSINOPHILS %: 0.4 %
AUTO IMMATURE GRANULOCYTES #: 0.06 K/UL
AUTO LYMPHOCYTES #: 2.04 K/UL
AUTO LYMPHOCYTES %: 20.4 %
AUTO MONOCYTES #: 1.09 K/UL
AUTO MONOCYTES %: 10.9 %
AUTO NEUTROPHILS #: 6.74 K/UL
AUTO NEUTROPHILS %: 67.4 %
AUTO NRBC #: 0 K/UL
BILIRUB SERPL-MCNC: 0.4 MG/DL
BUN SERPL-MCNC: 29 MG/DL
CALCIUM SERPL-MCNC: 9.8 MG/DL
CHLORIDE SERPL-SCNC: 101 MMOL/L
CO2 SERPL-SCNC: 27 MMOL/L
CREAT SERPL-MCNC: 0.8 MG/DL
EGFR: 74 ML/MIN/1.73M2
GLUCOSE SERPL-MCNC: 68 MG/DL
HCT VFR BLD CALC: 35.4 %
HGB BLD-MCNC: 11.9 G/DL
IMM GRANULOCYTES NFR BLD AUTO: 0.6 %
INR PPP: 0.89 RATIO
MAN DIFF?: NORMAL
MCHC RBC-ENTMCNC: 30.9 PG
MCHC RBC-ENTMCNC: 33.6 G/DL
MCV RBC AUTO: 91.9 FL
PLATELET # BLD AUTO: 320 K/UL
PMV BLD AUTO: 0 /100 WBCS
PMV BLD: 8.7 FL
POTASSIUM SERPL-SCNC: 4.2 MMOL/L
PROT SERPL-MCNC: 7 G/DL
PT BLD: 10.5 SEC
RBC # BLD: 3.85 M/UL
RBC # FLD: 13.7 %
SODIUM SERPL-SCNC: 138 MMOL/L
WBC # FLD AUTO: 10 K/UL

## 2025-02-28 PROCEDURE — 99214 OFFICE O/P EST MOD 30 MIN: CPT
